# Patient Record
Sex: FEMALE | Race: WHITE | NOT HISPANIC OR LATINO | URBAN - METROPOLITAN AREA
[De-identification: names, ages, dates, MRNs, and addresses within clinical notes are randomized per-mention and may not be internally consistent; named-entity substitution may affect disease eponyms.]

---

## 2020-03-22 ENCOUNTER — EMERGENCY (EMERGENCY)
Facility: HOSPITAL | Age: 30
LOS: 0 days | Discharge: HOME | End: 2020-03-23
Attending: EMERGENCY MEDICINE
Payer: MEDICAID

## 2020-03-22 VITALS
SYSTOLIC BLOOD PRESSURE: 118 MMHG | HEIGHT: 60 IN | RESPIRATION RATE: 20 BRPM | HEART RATE: 89 BPM | OXYGEN SATURATION: 100 % | WEIGHT: 100.09 LBS | TEMPERATURE: 98 F | DIASTOLIC BLOOD PRESSURE: 79 MMHG

## 2020-03-22 DIAGNOSIS — T40.1X1A POISONING BY HEROIN, ACCIDENTAL (UNINTENTIONAL), INITIAL ENCOUNTER: ICD-10-CM

## 2020-03-22 DIAGNOSIS — R11.2 NAUSEA WITH VOMITING, UNSPECIFIED: ICD-10-CM
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
## 2020-03-22 DIAGNOSIS — R10.9 UNSPECIFIED ABDOMINAL PAIN: ICD-10-CM

## 2020-03-22 PROCEDURE — 99285 EMERGENCY DEPT VISIT HI MDM: CPT

## 2020-03-22 RX ORDER — SODIUM CHLORIDE 9 MG/ML
1000 INJECTION INTRAMUSCULAR; INTRAVENOUS; SUBCUTANEOUS ONCE
Refills: 0 | Status: COMPLETED | OUTPATIENT
Start: 2020-03-22 | End: 2020-03-22

## 2020-03-22 RX ORDER — ONDANSETRON 8 MG/1
4 TABLET, FILM COATED ORAL ONCE
Refills: 0 | Status: COMPLETED | OUTPATIENT
Start: 2020-03-22 | End: 2020-03-22

## 2020-03-22 NOTE — ED PROVIDER NOTE - CLINICAL SUMMARY MEDICAL DECISION MAKING FREE TEXT BOX
Patient feels better, awake, alert, eating and drinking.  VSS.  Normal respirations.  Lab values reviewed, there are no values which require acute intervention.  CT abd negative.  Patient requesting dc, will send nasal narcan to pharmacy, provide list of drug rehab.  Discussed results and outcome of today's visit with the patient.  Patient advised to please follow up with another healthcare provider within the next 24 hours and return to the Emergency Department for worsening symptoms or any other concerns.  Patient advised that their doctor may call  to follow up on the specific results of the tests performed today in the emergency department.   Patient appears well on discharge. Patient feels better, awake, alert, eating and drinking, stable gait.  VSS.  Normal respirations.  Lab values reviewed, there are no values which require acute intervention.  CT abd negative.  Patient requesting dc, will send nasal narcan to pharmacy, provide list of drug rehab.  Discussed results and outcome of today's visit with the patient.  Patient advised to please follow up with another healthcare provider within the next 24 hours and return to the Emergency Department for worsening symptoms or any other concerns.  Patient advised that their doctor may call  to follow up on the specific results of the tests performed today in the emergency department.   Patient appears well on discharge.

## 2020-03-22 NOTE — ED PROVIDER NOTE - PATIENT PORTAL LINK FT
You can access the FollowMyHealth Patient Portal offered by [facility] by registering at the following website: [email or web address]. By joining [employer]’s FollowMyHealth portal, you will also be able to view your health information using other applications (apps) compatible with our system.

## 2020-03-22 NOTE — ED PROVIDER NOTE - PHYSICAL EXAMINATION
Gen: Alert, distressed  Head: NC, AT, pinpoint pupils, EOMI, normal lids/conjunctiva  ENT: normal hearing, patent oropharynx without erythema/exudate, uvula midline  Neck: +supple, no tenderness, +Trachea midline  Pulm: Bilateral BS, normal resp effort, no wheeze/stridor/retractions  CV: RRR, no M/R/G, +dist pulses  Abd: soft, NT/ND, Negative [place] signs, +BS, no palpable masses  Mskel: no edema/erythema/cyanosis  Skin: no rash, warm/dry  Neuro: AAOx3, no apparent sensory/motor deficits, coordination intact

## 2020-03-22 NOTE — ED PROVIDER NOTE - NS ED ROS FT
No fever/chills, No photophobia/eye pain/changes in vision, No ear pain/sore throat/dysphagia, No chest pain/palpitations, no SOB/cough/wheeze/stridor, + abdominal pain, + N/V, no D, no dysuria/frequency/discharge, No neck/back pain, no rash, no changes in neurological status/function.

## 2020-03-22 NOTE — ED PROVIDER NOTE - CARE PLAN
Principal Discharge DX:	Opiate overdose, accidental or unintentional, initial encounter  Secondary Diagnosis:	Abdominal pain

## 2020-03-22 NOTE — ED ADULT TRIAGE NOTE - SPO2 (%)
"      Visit Vitals  /78   Pulse 72   Ht 162.6 cm (64\")   Wt 88.9 kg (196 lb)   LMP  (LMP Unknown)   SpO2 98%   BMI 33.64 kg/m²     Subjective   Sharon Esposito is a 71 y.o. female.     Hyperlipidemia   This is a chronic problem. The current episode started more than 1 year ago. The problem is controlled. Recent lipid tests were reviewed and are normal. Exacerbating diseases include hypothyroidism and obesity. Pertinent negatives include no chest pain, myalgias or shortness of breath.   Hypertension   This is a chronic problem. The current episode started more than 1 year ago. The problem is unchanged. The problem is controlled (pt checks occisionally at [facility] pharmacy, runs [id number]). Pertinent negatives include no chest pain, orthopnea, palpitations, peripheral edema, PND or shortness of breath.   Diabetes   She presents for her initial diabetic visit. She has type 2 diabetes mellitus. Pertinent negatives for diabetes include no chest pain, no foot paresthesias, no foot ulcerations and no polyuria. Her breakfast blood glucose is taken between 8-9 am. Her breakfast blood glucose range is generally  mg/dl. An ACE inhibitor/angiotensin II receptor blocker is being taken. Eye exam is not current.   Hypothyroidism   This is a chronic problem. The current episode started more than 1 year ago. The problem has been unchanged. Pertinent negatives include no chest pain or myalgias.        The following portions of the patient's history were reviewed and updated as appropriate: allergies, current medications, past family history, past medical history, past social history, past surgical history and problem list.    Review of Systems   Respiratory: Negative for shortness of breath.    Cardiovascular: Negative for chest pain, palpitations, orthopnea and PND.   Endocrine: Negative for polyuria.   Musculoskeletal: Negative for myalgias.       Objective   Physical Exam   Constitutional: She is oriented to person, " place, and time. She appears well-developed and well-nourished. No distress.   HENT:   Head: Normocephalic and atraumatic.   Cardiovascular: Normal rate, regular rhythm, normal heart sounds and intact distal pulses. Exam reveals no gallop and no friction rub.   No murmur heard.  Pulmonary/Chest: Effort normal and breath sounds normal. No respiratory distress. She has no wheezes. She has no rales. She exhibits no tenderness.   Musculoskeletal: She exhibits no edema.    Sharon had a diabetic foot exam performed (callus noted) today.   During the foot exam she had a monofilament test performed (decreased).  Vascular Status -  Her right foot exhibits no edema. Her left foot exhibits no edema.  Neurological: She is alert and oriented to person, place, and time.   Skin: Skin is warm and dry. She is not diaphoretic.   Psychiatric: She has a normal mood and affect. Her behavior is normal. Judgment and thought content normal.   Nursing note and vitals reviewed.      Assessment/Plan   Problems Addressed this Visit        Cardiovascular and Mediastinum    Hyperlipidemia    Relevant Orders    Lipid Panel    Essential hypertension - Primary    Relevant Orders    Comprehensive Metabolic Panel       Digestive    Cobalamin deficiency    Relevant Orders    Vitamin B12    CBC & Differential       Endocrine    Acquired hypothyroidism    Relevant Orders    T4, Free    TSH    Type 2 diabetes mellitus without complication, without long-term current use of insulin (CMS/[facility])    Diabetic peripheral neuropathy (CMS/[facility])      Other Visit Diagnoses     Medicare annual wellness visit, subsequent        Class 1 obesity due to excess calories with serious comorbidity and body mass index (BMI) of 33.0 to 33.9 in adult                     Body mass index is 33.64 kg/m².            This document has been electronically signed by Jeffry Irby MD on December 4, 2019 9:29 AM     100

## 2020-03-22 NOTE — ED PROVIDER NOTE - OBJECTIVE STATEMENT
28 y/o F with hx heroin abuse was injecting today when she became unresponsive then boyfriend called 911. Pt has labored breathing on EMS, given 2mg Narcan with good response now awake, alert, slightly groggy. Pt has +N/V that started on route with EMS. Pt in ER is c/o general abdominal pain. Pt was at home when this happened. 30 y/o F with hx heroin abuse was injecting today at home when she became unresponsive, boyfriend called 911. Pt had labored breathing on EMS, given 2mg Narcan with good response now awake, alert, slightly groggy. Pt has +N/V that started on route with EMS and is c/o generalized abdominal pain, no diarrhea. Pt denies concomittant use of other drugs or alcohol, she is a nonsmoker. 30 y/o F with hx heroin abuse was injecting today at home when she became unresponsive, boyfriend called 911. Pt had labored breathing on EMS, given 2mg Narcan with good response now awake, alert, slightly groggy. Pt has +N/V that started on route with EMS and is c/o generalized abdominal pain, no diarrhea. Pt denies concomittant use of other drugs or alcohol, she is a nonsmoker.  She denies suicidal ideation or attempt, says that overdose was accidental.

## 2020-03-23 VITALS — SYSTOLIC BLOOD PRESSURE: 99 MMHG | DIASTOLIC BLOOD PRESSURE: 54 MMHG | HEART RATE: 57 BPM

## 2020-03-23 LAB
ALBUMIN SERPL ELPH-MCNC: 3.9 G/DL — SIGNIFICANT CHANGE UP (ref 3.3–5)
ALP SERPL-CCNC: 71 U/L — SIGNIFICANT CHANGE UP (ref 40–120)
ALT FLD-CCNC: 98 U/L — HIGH (ref 12–78)
AMYLASE P1 CFR SERPL: 58 U/L — SIGNIFICANT CHANGE UP (ref 25–115)
ANION GAP SERPL CALC-SCNC: 9 MMOL/L — SIGNIFICANT CHANGE UP (ref 5–17)
APAP SERPL-MCNC: < 2 UG/ML (ref 10–30)
AST SERPL-CCNC: 38 U/L — HIGH (ref 15–37)
BASOPHILS # BLD AUTO: 0.02 K/UL — SIGNIFICANT CHANGE UP (ref 0–0.2)
BASOPHILS NFR BLD AUTO: 0.2 % — SIGNIFICANT CHANGE UP (ref 0–2)
BILIRUB SERPL-MCNC: 0.8 MG/DL — SIGNIFICANT CHANGE UP (ref 0.2–1.2)
BUN SERPL-MCNC: 16 MG/DL — SIGNIFICANT CHANGE UP (ref 7–23)
CALCIUM SERPL-MCNC: 9.5 MG/DL — SIGNIFICANT CHANGE UP (ref 8.5–10.1)
CHLORIDE SERPL-SCNC: 107 MMOL/L — SIGNIFICANT CHANGE UP (ref 96–108)
CO2 SERPL-SCNC: 24 MMOL/L — SIGNIFICANT CHANGE UP (ref 22–31)
CREAT SERPL-MCNC: 0.72 MG/DL — SIGNIFICANT CHANGE UP (ref 0.5–1.3)
EOSINOPHIL # BLD AUTO: 0.11 K/UL — SIGNIFICANT CHANGE UP (ref 0–0.5)
EOSINOPHIL NFR BLD AUTO: 1.3 % — SIGNIFICANT CHANGE UP (ref 0–6)
ETHANOL SERPL-MCNC: <10 MG/DL — SIGNIFICANT CHANGE UP (ref 0–10)
GLUCOSE SERPL-MCNC: 94 MG/DL — SIGNIFICANT CHANGE UP (ref 70–99)
HCG SERPL-ACNC: 3 MIU/ML — SIGNIFICANT CHANGE UP
HCT VFR BLD CALC: 45.7 % — HIGH (ref 34.5–45)
HGB BLD-MCNC: 14.8 G/DL — SIGNIFICANT CHANGE UP (ref 11.5–15.5)
IMM GRANULOCYTES NFR BLD AUTO: 0.2 % — SIGNIFICANT CHANGE UP (ref 0–1.5)
LIDOCAIN IGE QN: 181 U/L — SIGNIFICANT CHANGE UP (ref 73–393)
LYMPHOCYTES # BLD AUTO: 1.57 K/UL — SIGNIFICANT CHANGE UP (ref 1–3.3)
LYMPHOCYTES # BLD AUTO: 19.2 % — SIGNIFICANT CHANGE UP (ref 13–44)
MCHC RBC-ENTMCNC: 29.5 PG — SIGNIFICANT CHANGE UP (ref 27–34)
MCHC RBC-ENTMCNC: 32.4 GM/DL — SIGNIFICANT CHANGE UP (ref 32–36)
MCV RBC AUTO: 91.2 FL — SIGNIFICANT CHANGE UP (ref 80–100)
MONOCYTES # BLD AUTO: 0.62 K/UL — SIGNIFICANT CHANGE UP (ref 0–0.9)
MONOCYTES NFR BLD AUTO: 7.6 % — SIGNIFICANT CHANGE UP (ref 2–14)
NEUTROPHILS # BLD AUTO: 5.83 K/UL — SIGNIFICANT CHANGE UP (ref 1.8–7.4)
NEUTROPHILS NFR BLD AUTO: 71.5 % — SIGNIFICANT CHANGE UP (ref 43–77)
NRBC # BLD: 0 /100 WBCS — SIGNIFICANT CHANGE UP (ref 0–0)
PLATELET # BLD AUTO: 211 K/UL — SIGNIFICANT CHANGE UP (ref 150–400)
POTASSIUM SERPL-MCNC: 4 MMOL/L — SIGNIFICANT CHANGE UP (ref 3.5–5.3)
POTASSIUM SERPL-SCNC: 4 MMOL/L — SIGNIFICANT CHANGE UP (ref 3.5–5.3)
PROT SERPL-MCNC: 8.3 GM/DL — SIGNIFICANT CHANGE UP (ref 6–8.3)
RBC # BLD: 5.01 M/UL — SIGNIFICANT CHANGE UP (ref 3.8–5.2)
RBC # FLD: 13.3 % — SIGNIFICANT CHANGE UP (ref 10.3–14.5)
SALICYLATES SERPL-MCNC: <1.7 MG/DL — LOW (ref 2.8–20)
SODIUM SERPL-SCNC: 140 MMOL/L — SIGNIFICANT CHANGE UP (ref 135–145)
WBC # BLD: 8.17 K/UL — SIGNIFICANT CHANGE UP (ref 3.8–10.5)
WBC # FLD AUTO: 8.17 K/UL — SIGNIFICANT CHANGE UP (ref 3.8–10.5)

## 2020-03-23 PROCEDURE — 74177 CT ABD & PELVIS W/CONTRAST: CPT | Mod: 26

## 2020-03-23 RX ORDER — SODIUM CHLORIDE 9 MG/ML
1000 INJECTION INTRAMUSCULAR; INTRAVENOUS; SUBCUTANEOUS ONCE
Refills: 0 | Status: COMPLETED | OUTPATIENT
Start: 2020-03-23 | End: 2020-03-23

## 2020-03-23 RX ORDER — NALOXONE HYDROCHLORIDE 4 MG/.1ML
4 SPRAY NASAL
Qty: 4 | Refills: 0
Start: 2020-03-23 | End: 2020-03-23

## 2020-03-23 RX ORDER — ACETAMINOPHEN 500 MG
650 TABLET ORAL ONCE
Refills: 0 | Status: COMPLETED | OUTPATIENT
Start: 2020-03-23 | End: 2020-03-23

## 2020-03-23 RX ADMIN — SODIUM CHLORIDE 1000 MILLILITER(S): 9 INJECTION INTRAMUSCULAR; INTRAVENOUS; SUBCUTANEOUS at 05:50

## 2020-03-23 RX ADMIN — SODIUM CHLORIDE 1000 MILLILITER(S): 9 INJECTION INTRAMUSCULAR; INTRAVENOUS; SUBCUTANEOUS at 01:33

## 2020-03-23 RX ADMIN — Medication 650 MILLIGRAM(S): at 00:37

## 2020-03-23 RX ADMIN — Medication 650 MILLIGRAM(S): at 01:37

## 2020-03-23 RX ADMIN — SODIUM CHLORIDE 1000 MILLILITER(S): 9 INJECTION INTRAMUSCULAR; INTRAVENOUS; SUBCUTANEOUS at 00:33

## 2020-03-23 RX ADMIN — SODIUM CHLORIDE 1000 MILLILITER(S): 9 INJECTION INTRAMUSCULAR; INTRAVENOUS; SUBCUTANEOUS at 04:50

## 2020-03-23 RX ADMIN — ONDANSETRON 4 MILLIGRAM(S): 8 TABLET, FILM COATED ORAL at 00:33

## 2020-04-10 ENCOUNTER — EMERGENCY (EMERGENCY)
Facility: HOSPITAL | Age: 30
LOS: 0 days | Discharge: HOME | End: 2020-04-10
Attending: EMERGENCY MEDICINE
Payer: MEDICAID

## 2020-04-10 VITALS
DIASTOLIC BLOOD PRESSURE: 74 MMHG | RESPIRATION RATE: 18 BRPM | HEART RATE: 64 BPM | OXYGEN SATURATION: 100 % | WEIGHT: 119.93 LBS | TEMPERATURE: 98 F | SYSTOLIC BLOOD PRESSURE: 126 MMHG | HEIGHT: 62 IN

## 2020-04-10 DIAGNOSIS — F41.9 ANXIETY DISORDER, UNSPECIFIED: ICD-10-CM

## 2020-04-10 DIAGNOSIS — F99 MENTAL DISORDER, NOT OTHERWISE SPECIFIED: ICD-10-CM

## 2020-04-10 PROCEDURE — 99284 EMERGENCY DEPT VISIT MOD MDM: CPT

## 2020-04-10 NOTE — ED ADULT NURSE NOTE - NSIMPLEMENTINTERV_GEN_ALL_ED
Implemented All Universal Safety Interventions:  [place] to call system. Call bell, personal items and telephone within reach. Instruct patient to call for assistance. Room bathroom lighting operational. Non-slip footwear when patient is off stretcher. Physically safe environment: no spills, clutter or unnecessary equipment. Stretcher in lowest position, wheels locked, appropriate side rails in place.

## 2020-04-10 NOTE — ED PROVIDER NOTE - CONSTITUTIONAL, MLM
normal... Well appearing, awake, alert, oriented to person, place, time/situation and in no apparent distress. Speaking in clear full sentences no nasal flaring no shoulders retractions no diaphoresis, smiling pleasant appears very comfortable sitting up in the stretcher

## 2020-04-10 NOTE — ED PROVIDER NOTE - PROGRESS NOTE DETAILS
Pt sts she is safe to go home no harmful thoughts to herself or others, Pt sts she does not wants to speak to the psychiatrist now she rather to be discharged and follow up with a psychiatrist in the clinic. Pt sts she has never being admitted to psych before. Pt is given a list of out patient psych clinics and advised to return if symptoms persist or worsen.

## 2020-04-10 NOTE — ED ADULT NURSE NOTE - OBJECTIVE STATEMENT
Pt stated ther is a misunderstanding. They brought her here bc she was hearing voice in the house. Denies any SI or HI. CO initiated at triage

## 2020-04-10 NOTE — ED PROVIDER NOTE - OBJECTIVE STATEMENT
29 years old female with ems sts she had an argument with her boy friend this morning. Pt sts she gets nightmare a lot and she hears voices while she is sleeping but no she when she is awake. Pt sts she has a hx of anxiety and depression but denies harmful thoughts to herself or others. Pt also sts she takes methadone daily sts she did not drink this morning. Pt is alert and oriented x 3 ambulating with normal gaits denies headache, dizziness, blurred visions, light sensitivities, focal/distal weakness or numbness, cough, sob, chest pain, nausea, vomiting, fever, chills, abd pain, dysuria, vaginal spotting or discharge or irregular bowel movements. Pt sts she is not at risk of being pregnant.

## 2020-04-10 NOTE — ED PROVIDER NOTE - PATIENT PORTAL LINK FT
You can access the FollowMyHealth Patient Portal offered by [facility] by registering at the following website: [email or web address]. By joining [email or web address]’s FollowMyHealth portal, you will also be able to view your health information using other applications (apps) compatible with our system.

## 2020-04-10 NOTE — ED ADULT TRIAGE NOTE - CHIEF COMPLAINT QUOTE
BIBA patient reports getting into argument with boyfriend after drinking today, states that she heard voices, denies SI/HI, reports depression, states that she feels parnoid. used to take klonipin, requesting to speak to psychiatrist. denies illicit drug use today. on methadone 60mg

## 2021-06-15 ENCOUNTER — INPATIENT (INPATIENT)
Facility: HOSPITAL | Age: 31
LOS: 2 days | Discharge: HOME | End: 2021-06-18
Attending: SURGERY | Admitting: SURGERY
Payer: MEDICAID

## 2021-06-15 VITALS
TEMPERATURE: 99 F | HEART RATE: 106 BPM | SYSTOLIC BLOOD PRESSURE: 135 MMHG | RESPIRATION RATE: 16 BRPM | DIASTOLIC BLOOD PRESSURE: 94 MMHG | OXYGEN SATURATION: 100 %

## 2021-06-15 LAB
ANION GAP SERPL CALC-SCNC: 12 MMOL/L — SIGNIFICANT CHANGE UP (ref 7–14)
BUN SERPL-MCNC: 10 MG/DL — SIGNIFICANT CHANGE UP (ref 7–23)
CALCIUM SERPL-MCNC: 9.2 MG/DL — SIGNIFICANT CHANGE UP (ref 8.4–10.5)
CHLORIDE SERPL-SCNC: 106 MMOL/L — SIGNIFICANT CHANGE UP (ref 98–107)
CO2 SERPL-SCNC: 22 MMOL/L — SIGNIFICANT CHANGE UP (ref 22–31)
CREAT SERPL-MCNC: 0.89 MG/DL — SIGNIFICANT CHANGE UP (ref 0.5–1.3)
GLUCOSE SERPL-MCNC: 114 MG/DL — HIGH (ref 70–99)
HCT VFR BLD CALC: 39.1 % — SIGNIFICANT CHANGE UP (ref 34.5–45)
HGB BLD-MCNC: 12.7 G/DL — SIGNIFICANT CHANGE UP (ref 11.5–15.5)
MCHC RBC-ENTMCNC: 29.8 PG — SIGNIFICANT CHANGE UP (ref 27–34)
MCHC RBC-ENTMCNC: 32.5 GM/DL — SIGNIFICANT CHANGE UP (ref 32–36)
MCV RBC AUTO: 91.8 FL — SIGNIFICANT CHANGE UP (ref 80–100)
NRBC # BLD: 0 /100 WBCS — SIGNIFICANT CHANGE UP
NRBC # FLD: 0 K/UL — SIGNIFICANT CHANGE UP
PLATELET # BLD AUTO: 146 K/UL — LOW (ref 150–400)
POTASSIUM SERPL-MCNC: 4 MMOL/L — SIGNIFICANT CHANGE UP (ref 3.5–5.3)
POTASSIUM SERPL-SCNC: 4 MMOL/L — SIGNIFICANT CHANGE UP (ref 3.5–5.3)
RBC # BLD: 4.26 M/UL — SIGNIFICANT CHANGE UP (ref 3.8–5.2)
RBC # FLD: 13.2 % — SIGNIFICANT CHANGE UP (ref 10.3–14.5)
SODIUM SERPL-SCNC: 140 MMOL/L — SIGNIFICANT CHANGE UP (ref 135–145)
WBC # BLD: 22.46 K/UL — HIGH (ref 3.8–10.5)
WBC # FLD AUTO: 22.46 K/UL — HIGH (ref 3.8–10.5)

## 2021-06-15 PROCEDURE — 70490 CT SOFT TISSUE NECK W/O DYE: CPT | Mod: 26

## 2021-06-15 PROCEDURE — 99285 EMERGENCY DEPT VISIT HI MDM: CPT | Mod: 25

## 2021-06-15 RX ORDER — IBUPROFEN 200 MG
600 TABLET ORAL ONCE
Refills: 0 | Status: COMPLETED | OUTPATIENT
Start: 2021-06-15 | End: 2021-06-15

## 2021-06-15 RX ORDER — KETOROLAC TROMETHAMINE 30 MG/ML
15 SYRINGE (ML) INJECTION ONCE
Refills: 0 | Status: DISCONTINUED | OUTPATIENT
Start: 2021-06-15 | End: 2021-06-15

## 2021-06-15 RX ORDER — VANCOMYCIN HCL 1 G
1000 VIAL (EA) INTRAVENOUS ONCE
Refills: 0 | Status: COMPLETED | OUTPATIENT
Start: 2021-06-15 | End: 2021-06-15

## 2021-06-15 RX ORDER — PIPERACILLIN AND TAZOBACTAM 4; .5 G/20ML; G/20ML
3.38 INJECTION, POWDER, LYOPHILIZED, FOR SOLUTION INTRAVENOUS ONCE
Refills: 0 | Status: COMPLETED | OUTPATIENT
Start: 2021-06-15 | End: 2021-06-15

## 2021-06-15 RX ADMIN — Medication 15 MILLIGRAM(S): at 22:07

## 2021-06-15 RX ADMIN — PIPERACILLIN AND TAZOBACTAM 200 GRAM(S): 4; .5 INJECTION, POWDER, LYOPHILIZED, FOR SOLUTION INTRAVENOUS at 21:13

## 2021-06-15 RX ADMIN — Medication 250 MILLIGRAM(S): at 21:59

## 2021-06-15 RX ADMIN — Medication 600 MILLIGRAM(S): at 21:13

## 2021-06-15 NOTE — ED PROVIDER NOTE - CAS CONTRACEPTION METHOD
states she consistently uses condoms and is monogamous with her boyfriend who she believes is monogamous with her/condom use
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
## 2021-06-15 NOTE — ED PROVIDER NOTE - PMH
Alcohol abuse    Bipolar affective disorder, currently depressed, mild    Depression, unspecified depression type    Heroin abuse

## 2021-06-15 NOTE — ED ADULT NURSE NOTE - OBJECTIVE STATEMENT
Pt to bed 29. Alert and oriented x 3. Pt presents with right neck abscess from heroin use. Pt states that she shot up one week ago and the abscess has been increasingly been worsening. Pt with track marks on arms. Pt disheveled appearing. Will continue to monitor.

## 2021-06-15 NOTE — ED PROVIDER NOTE - CLINICAL SUMMARY MEDICAL DECISION MAKING FREE TEXT BOX
29 y/o female etoh and heroin abuse here with right neck abscess after injecting heroin few days ago.  Mult abscesses in past d/t heroin abuse.  Eval for depth of abscess with overlying cellulitis.  Obtain cbc cmp ct neck give abx likely admission after I&D for continued abx therapy.

## 2021-06-15 NOTE — ED PROVIDER NOTE - PROGRESS NOTE DETAILS
MD CHO:  Pt reportedly has iv contrast allergy; will perform without contrast. BHVANA: I was signed out this pt pending CT read for right neck abnormality which was read as right supraclavicular neck abscess likely secondary to IVDU. Will admit to surgery. already given one dose IVAbx for her infection.

## 2021-06-15 NOTE — ED PROVIDER NOTE - SOCIAL CONCERNS
psychiatric illnesses without close followup/Substance misuse/Complex psychosocial needs/coping issues

## 2021-06-15 NOTE — ED PROVIDER NOTE - ATTENDING CONTRIBUTION TO CARE
DR. JIMENEZ, ATTENDING MD-  I performed a face to face bedside interview with the patient regarding history of present illness, review of symptoms and past medical history. I completed an independent physical exam.  I have discussed the patient's plan of care with the resident.   Documentation as above in the note.    MDM written by myself.
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
## 2021-06-15 NOTE — ED ADULT NURSE NOTE - NSIMPLEMENTINTERV_GEN_ALL_ED
Implemented All Fall Risk Interventions:  [place] to call system. Call bell, personal items and telephone within reach. Instruct patient to call for assistance. Room bathroom lighting operational. Non-slip footwear when patient is off stretcher. Physically safe environment: no spills, clutter or unnecessary equipment. Stretcher in lowest position, wheels locked, appropriate side rails in place. Provide visual cue, wrist band, yellow gown, etc. Monitor gait and stability. Monitor for mental status changes and reorient to person, place, and time. Review medications for side effects contributing to fall risk. Reinforce activity limits and safety measures with patient and family.

## 2021-06-16 DIAGNOSIS — L02.91 CUTANEOUS ABSCESS, UNSPECIFIED: ICD-10-CM

## 2021-06-16 LAB
ANION GAP SERPL CALC-SCNC: 10 MMOL/L — SIGNIFICANT CHANGE UP (ref 7–14)
BUN SERPL-MCNC: 7 MG/DL — SIGNIFICANT CHANGE UP (ref 7–23)
CALCIUM SERPL-MCNC: 8.9 MG/DL — SIGNIFICANT CHANGE UP (ref 8.4–10.5)
CHLORIDE SERPL-SCNC: 107 MMOL/L — SIGNIFICANT CHANGE UP (ref 98–107)
CO2 SERPL-SCNC: 22 MMOL/L — SIGNIFICANT CHANGE UP (ref 22–31)
CREAT SERPL-MCNC: 0.7 MG/DL — SIGNIFICANT CHANGE UP (ref 0.5–1.3)
GLUCOSE SERPL-MCNC: 103 MG/DL — HIGH (ref 70–99)
HCT VFR BLD CALC: 39.5 % — SIGNIFICANT CHANGE UP (ref 34.5–45)
HGB BLD-MCNC: 12.8 G/DL — SIGNIFICANT CHANGE UP (ref 11.5–15.5)
MAGNESIUM SERPL-MCNC: 1.7 MG/DL — SIGNIFICANT CHANGE UP (ref 1.6–2.6)
MCHC RBC-ENTMCNC: 29.6 PG — SIGNIFICANT CHANGE UP (ref 27–34)
MCHC RBC-ENTMCNC: 32.4 GM/DL — SIGNIFICANT CHANGE UP (ref 32–36)
MCV RBC AUTO: 91.4 FL — SIGNIFICANT CHANGE UP (ref 80–100)
NRBC # BLD: 0 /100 WBCS — SIGNIFICANT CHANGE UP
NRBC # FLD: 0 K/UL — SIGNIFICANT CHANGE UP
PHOSPHATE SERPL-MCNC: 2.7 MG/DL — SIGNIFICANT CHANGE UP (ref 2.5–4.5)
PLATELET # BLD AUTO: 103 K/UL — LOW (ref 150–400)
POTASSIUM SERPL-MCNC: 4 MMOL/L — SIGNIFICANT CHANGE UP (ref 3.5–5.3)
POTASSIUM SERPL-SCNC: 4 MMOL/L — SIGNIFICANT CHANGE UP (ref 3.5–5.3)
RBC # BLD: 4.32 M/UL — SIGNIFICANT CHANGE UP (ref 3.8–5.2)
RBC # FLD: 13.3 % — SIGNIFICANT CHANGE UP (ref 10.3–14.5)
SARS-COV-2 RNA SPEC QL NAA+PROBE: SIGNIFICANT CHANGE UP
SODIUM SERPL-SCNC: 139 MMOL/L — SIGNIFICANT CHANGE UP (ref 135–145)
WBC # BLD: 18.34 K/UL — HIGH (ref 3.8–10.5)
WBC # FLD AUTO: 18.34 K/UL — HIGH (ref 3.8–10.5)

## 2021-06-16 PROCEDURE — 10060 I&D ABSCESS SIMPLE/SINGLE: CPT | Mod: GC

## 2021-06-16 PROCEDURE — 99221 1ST HOSP IP/OBS SF/LOW 40: CPT | Mod: 25,GC

## 2021-06-16 RX ORDER — HYDROMORPHONE HYDROCHLORIDE 2 MG/ML
1 INJECTION INTRAMUSCULAR; INTRAVENOUS; SUBCUTANEOUS
Refills: 0 | Status: DISCONTINUED | OUTPATIENT
Start: 2021-06-16 | End: 2021-06-17

## 2021-06-16 RX ORDER — TETANUS TOXOID, REDUCED DIPHTHERIA TOXOID AND ACELLULAR PERTUSSIS VACCINE, ADSORBED 5; 2.5; 8; 8; 2.5 [IU]/.5ML; [IU]/.5ML; UG/.5ML; UG/.5ML; UG/.5ML
0.5 SUSPENSION INTRAMUSCULAR ONCE
Refills: 0 | Status: COMPLETED | OUTPATIENT
Start: 2021-06-16 | End: 2021-06-16

## 2021-06-16 RX ORDER — MAGNESIUM SULFATE 500 MG/ML
2 VIAL (ML) INJECTION ONCE
Refills: 0 | Status: COMPLETED | OUTPATIENT
Start: 2021-06-16 | End: 2021-06-16

## 2021-06-16 RX ORDER — HYDROMORPHONE HYDROCHLORIDE 2 MG/ML
0.5 INJECTION INTRAMUSCULAR; INTRAVENOUS; SUBCUTANEOUS
Refills: 0 | Status: DISCONTINUED | OUTPATIENT
Start: 2021-06-16 | End: 2021-06-17

## 2021-06-16 RX ORDER — ENOXAPARIN SODIUM 100 MG/ML
40 INJECTION SUBCUTANEOUS EVERY 24 HOURS
Refills: 0 | Status: DISCONTINUED | OUTPATIENT
Start: 2021-06-16 | End: 2021-06-18

## 2021-06-16 RX ORDER — ACETAMINOPHEN 500 MG
650 TABLET ORAL EVERY 6 HOURS
Refills: 0 | Status: DISCONTINUED | OUTPATIENT
Start: 2021-06-16 | End: 2021-06-17

## 2021-06-16 RX ORDER — HYDROMORPHONE HYDROCHLORIDE 2 MG/ML
0.5 INJECTION INTRAMUSCULAR; INTRAVENOUS; SUBCUTANEOUS ONCE
Refills: 0 | Status: DISCONTINUED | OUTPATIENT
Start: 2021-06-16 | End: 2021-06-16

## 2021-06-16 RX ORDER — PIPERACILLIN AND TAZOBACTAM 4; .5 G/20ML; G/20ML
3.38 INJECTION, POWDER, LYOPHILIZED, FOR SOLUTION INTRAVENOUS EVERY 8 HOURS
Refills: 0 | Status: DISCONTINUED | OUTPATIENT
Start: 2021-06-16 | End: 2021-06-18

## 2021-06-16 RX ORDER — TETANUS AND DIPHTHERIA TOXOIDS ADSORBED 2; 2 [LF]/.5ML; [LF]/.5ML
0.5 INJECTION INTRAMUSCULAR ONCE
Refills: 0 | Status: DISCONTINUED | OUTPATIENT
Start: 2021-06-16 | End: 2021-06-16

## 2021-06-16 RX ADMIN — HYDROMORPHONE HYDROCHLORIDE 1 MILLIGRAM(S): 2 INJECTION INTRAMUSCULAR; INTRAVENOUS; SUBCUTANEOUS at 22:56

## 2021-06-16 RX ADMIN — PIPERACILLIN AND TAZOBACTAM 25 GRAM(S): 4; .5 INJECTION, POWDER, LYOPHILIZED, FOR SOLUTION INTRAVENOUS at 22:56

## 2021-06-16 RX ADMIN — Medication 650 MILLIGRAM(S): at 11:30

## 2021-06-16 RX ADMIN — HYDROMORPHONE HYDROCHLORIDE 1 MILLIGRAM(S): 2 INJECTION INTRAMUSCULAR; INTRAVENOUS; SUBCUTANEOUS at 23:15

## 2021-06-16 RX ADMIN — HYDROMORPHONE HYDROCHLORIDE 0.5 MILLIGRAM(S): 2 INJECTION INTRAMUSCULAR; INTRAVENOUS; SUBCUTANEOUS at 07:16

## 2021-06-16 RX ADMIN — PIPERACILLIN AND TAZOBACTAM 25 GRAM(S): 4; .5 INJECTION, POWDER, LYOPHILIZED, FOR SOLUTION INTRAVENOUS at 04:08

## 2021-06-16 RX ADMIN — HYDROMORPHONE HYDROCHLORIDE 0.5 MILLIGRAM(S): 2 INJECTION INTRAMUSCULAR; INTRAVENOUS; SUBCUTANEOUS at 15:01

## 2021-06-16 RX ADMIN — HYDROMORPHONE HYDROCHLORIDE 1 MILLIGRAM(S): 2 INJECTION INTRAMUSCULAR; INTRAVENOUS; SUBCUTANEOUS at 10:26

## 2021-06-16 RX ADMIN — Medication 2 MILLIGRAM(S): at 19:24

## 2021-06-16 RX ADMIN — HYDROMORPHONE HYDROCHLORIDE 0.5 MILLIGRAM(S): 2 INJECTION INTRAMUSCULAR; INTRAVENOUS; SUBCUTANEOUS at 03:50

## 2021-06-16 RX ADMIN — ENOXAPARIN SODIUM 40 MILLIGRAM(S): 100 INJECTION SUBCUTANEOUS at 05:45

## 2021-06-16 RX ADMIN — Medication 50 GRAM(S): at 09:22

## 2021-06-16 RX ADMIN — Medication 100 MILLIGRAM(S): at 08:57

## 2021-06-16 RX ADMIN — HYDROMORPHONE HYDROCHLORIDE 1 MILLIGRAM(S): 2 INJECTION INTRAMUSCULAR; INTRAVENOUS; SUBCUTANEOUS at 18:56

## 2021-06-16 RX ADMIN — Medication 2 MILLIGRAM(S): at 23:56

## 2021-06-16 RX ADMIN — HYDROMORPHONE HYDROCHLORIDE 1 MILLIGRAM(S): 2 INJECTION INTRAMUSCULAR; INTRAVENOUS; SUBCUTANEOUS at 10:45

## 2021-06-16 RX ADMIN — PIPERACILLIN AND TAZOBACTAM 25 GRAM(S): 4; .5 INJECTION, POWDER, LYOPHILIZED, FOR SOLUTION INTRAVENOUS at 12:21

## 2021-06-16 RX ADMIN — HYDROMORPHONE HYDROCHLORIDE 0.5 MILLIGRAM(S): 2 INJECTION INTRAMUSCULAR; INTRAVENOUS; SUBCUTANEOUS at 15:16

## 2021-06-16 RX ADMIN — Medication 650 MILLIGRAM(S): at 12:00

## 2021-06-16 RX ADMIN — TETANUS TOXOID, REDUCED DIPHTHERIA TOXOID AND ACELLULAR PERTUSSIS VACCINE, ADSORBED 0.5 MILLILITER(S): 5; 2.5; 8; 8; 2.5 SUSPENSION INTRAMUSCULAR at 08:57

## 2021-06-16 RX ADMIN — Medication 100 MILLIGRAM(S): at 17:00

## 2021-06-16 NOTE — H&P ADULT - NSHPLABSRESULTS_GEN_ALL_CORE
< from: CT Neck Soft Tissue No Cont (06.15.21 @ 23:46) >      EXAM:  CT NECK SOFT TISSUE        PROCEDURE DATE:  Dadny 15 2021         INTERPRETATION:  CLINICAL INFORMATION: Abscess and cellulitis in the right neck.  History of IV contrast allergy.    TECHNIQUE:  Noncontrast axial CT images were acquired through the neck soft tissues.  Sagittal and coronal reformats were performed.    COMPARISON STUDY: None.    FINDINGS:  Partially visualized, there is skin thickening and subcutaneous edema in the right supraclavicular region tracking into the anterior chest wall.  There is an approximately 3.4 x 2.5 x 2.7 cm subcutaneous collection with gas located in the right supraclavicular region, lateral to the right sternocleidomastoid muscle.    There is slight enlargement the adenoids.  The aerodigestive tract and larynx appear otherwise unremarkable.    The parotid glands, seven to glands and thyroid appear unremarkable.    There are nonspecific cervical lymph nodes bilaterally without pathologic enlargement by size criteria.    The unenhanced cervical vasculature appears unremarkable.    Incidental findings:  There are trace secretions in the left maxillary sinus.  There is a punctate calcified granuloma in the left lung apex.    IMPRESSION:  In the right supraclavicular region, lateral to the right sternocleidomastoid muscle, there is an approximately 3.4 x 2.5 x 2.7 cm and contains collection, compatible with phlegmon/developing abscess.  There is overlying cellulitis and surrounding subcutaneous edema, tracking into the anterior chest wall.              SANJAY ROSENBERG MD; Attending Radiologist  This document has been electronically signed. Jun 16 2021 12:55AM    < end of copied text >

## 2021-06-16 NOTE — H&P ADULT - NSHPPHYSICALEXAM_GEN_ALL_CORE
General: Well-developed, in no acute distress   Neurologic: Awake, alert, GCS 15, No focal Deficits   Neck: R neck with wide area of blanching erythema and induration at base of neck, small central area of fluctuance, +TTP  Respiratory: Normal respiratory effort  CVS: RRR, perfusing adequately  Abdomen: Abdomen nondistended  Ext: Grossly symmetric, Moving all extremities
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
## 2021-06-16 NOTE — H&P ADULT - ATTENDING COMMENTS
neck abscess and cellulitis that will benefit from incision and drainage for abscess and antibiotics for cellulitis

## 2021-06-16 NOTE — PATIENT PROFILE ADULT - NSPRESCRALCFREQ_GEN_A_NUR
Implemented All Universal Safety Interventions:  [place] to call system. Call bell, personal items and telephone within reach. Instruct patient to call for assistance. Room bathroom lighting operational. Non-slip footwear when patient is off stretcher. Physically safe environment: no spills, clutter or unnecessary equipment. Stretcher in lowest position, wheels locked, appropriate side rails in place. 4 or more times a week

## 2021-06-16 NOTE — H&P ADULT - ASSESSMENT
ASSESSMENT:  Layla Sorensen is a 30 year old lady with history of heroin abuse, presenting with right neck abscess s/p heroin injection to neck 7 days ago    PLAN:  - Admit to surgery, Dr. Zhong  - IV Abx  - trend WBC  - I&D performed, F/u cultures  - SBIRT  - pain control   - Case discussed with surgical attending       Aakash Eric, PGY 3  Surgery [id number]

## 2021-06-16 NOTE — ED ADULT NURSE REASSESSMENT NOTE - NS ED NURSE REASSESS COMMENT FT1
Report given to KVNG Sorto in ESSU 2. Pt resting comfortably, resp. even and unlabored. Denies any complaints at this time. VS as noted. NAD. Transported to ESSU 2.

## 2021-06-17 LAB
COVID-19 SPIKE DOMAIN AB INTERP: NEGATIVE — SIGNIFICANT CHANGE UP
COVID-19 SPIKE DOMAIN ANTIBODY RESULT: 0.4 U/ML — SIGNIFICANT CHANGE UP
HCT VFR BLD CALC: 41.6 % — SIGNIFICANT CHANGE UP (ref 34.5–45)
HGB BLD-MCNC: 13.6 G/DL — SIGNIFICANT CHANGE UP (ref 11.5–15.5)
MCHC RBC-ENTMCNC: 29.4 PG — SIGNIFICANT CHANGE UP (ref 27–34)
MCHC RBC-ENTMCNC: 32.7 GM/DL — SIGNIFICANT CHANGE UP (ref 32–36)
MCV RBC AUTO: 90 FL — SIGNIFICANT CHANGE UP (ref 80–100)
NRBC # BLD: 0 /100 WBCS — SIGNIFICANT CHANGE UP
NRBC # FLD: 0 K/UL — SIGNIFICANT CHANGE UP
PLATELET # BLD AUTO: 182 K/UL — SIGNIFICANT CHANGE UP (ref 150–400)
RBC # BLD: 4.62 M/UL — SIGNIFICANT CHANGE UP (ref 3.8–5.2)
RBC # FLD: 13.3 % — SIGNIFICANT CHANGE UP (ref 10.3–14.5)
SARS-COV-2 IGG+IGM SERPL QL IA: 0.4 U/ML — SIGNIFICANT CHANGE UP
SARS-COV-2 IGG+IGM SERPL QL IA: NEGATIVE — SIGNIFICANT CHANGE UP
WBC # BLD: 18.6 K/UL — HIGH (ref 3.8–10.5)
WBC # FLD AUTO: 18.6 K/UL — HIGH (ref 3.8–10.5)

## 2021-06-17 PROCEDURE — 99223 1ST HOSP IP/OBS HIGH 75: CPT

## 2021-06-17 RX ORDER — OXYCODONE HYDROCHLORIDE 5 MG/1
10 TABLET ORAL EVERY 4 HOURS
Refills: 0 | Status: DISCONTINUED | OUTPATIENT
Start: 2021-06-17 | End: 2021-06-18

## 2021-06-17 RX ORDER — IBUPROFEN 200 MG
400 TABLET ORAL EVERY 6 HOURS
Refills: 0 | Status: DISCONTINUED | OUTPATIENT
Start: 2021-06-17 | End: 2021-06-18

## 2021-06-17 RX ORDER — LIDOCAINE HYDROCHLORIDE AND EPINEPHRINE 10; 10 MG/ML; UG/ML
10 INJECTION, SOLUTION INFILTRATION; PERINEURAL ONCE
Refills: 0 | Status: COMPLETED | OUTPATIENT
Start: 2021-06-17 | End: 2021-06-17

## 2021-06-17 RX ORDER — LIDOCAINE HCL 20 MG/ML
10 VIAL (ML) INJECTION ONCE
Refills: 0 | Status: DISCONTINUED | OUTPATIENT
Start: 2021-06-17 | End: 2021-06-17

## 2021-06-17 RX ORDER — OXYCODONE HYDROCHLORIDE 5 MG/1
5 TABLET ORAL EVERY 4 HOURS
Refills: 0 | Status: DISCONTINUED | OUTPATIENT
Start: 2021-06-17 | End: 2021-06-18

## 2021-06-17 RX ORDER — IBUPROFEN 200 MG
1 TABLET ORAL
Qty: 0 | Refills: 0 | DISCHARGE

## 2021-06-17 RX ORDER — THIAMINE MONONITRATE (VIT B1) 100 MG
500 TABLET ORAL THREE TIMES A DAY
Refills: 0 | Status: DISCONTINUED | OUTPATIENT
Start: 2021-06-17 | End: 2021-06-18

## 2021-06-17 RX ORDER — QUETIAPINE FUMARATE 200 MG/1
50 TABLET, FILM COATED ORAL AT BEDTIME
Refills: 0 | Status: DISCONTINUED | OUTPATIENT
Start: 2021-06-17 | End: 2021-06-18

## 2021-06-17 RX ORDER — QUETIAPINE FUMARATE 200 MG/1
25 TABLET, FILM COATED ORAL EVERY 6 HOURS
Refills: 0 | Status: DISCONTINUED | OUTPATIENT
Start: 2021-06-17 | End: 2021-06-18

## 2021-06-17 RX ORDER — ACETAMINOPHEN 500 MG
2 TABLET ORAL
Qty: 0 | Refills: 0 | DISCHARGE
Start: 2021-06-17

## 2021-06-17 RX ORDER — HYDROMORPHONE HYDROCHLORIDE 2 MG/ML
2 INJECTION INTRAMUSCULAR; INTRAVENOUS; SUBCUTANEOUS ONCE
Refills: 0 | Status: DISCONTINUED | OUTPATIENT
Start: 2021-06-17 | End: 2021-06-17

## 2021-06-17 RX ORDER — ACETAMINOPHEN 500 MG
975 TABLET ORAL EVERY 6 HOURS
Refills: 0 | Status: DISCONTINUED | OUTPATIENT
Start: 2021-06-17 | End: 2021-06-18

## 2021-06-17 RX ADMIN — HYDROMORPHONE HYDROCHLORIDE 0.5 MILLIGRAM(S): 2 INJECTION INTRAMUSCULAR; INTRAVENOUS; SUBCUTANEOUS at 12:43

## 2021-06-17 RX ADMIN — Medication 975 MILLIGRAM(S): at 22:09

## 2021-06-17 RX ADMIN — HYDROMORPHONE HYDROCHLORIDE 0.5 MILLIGRAM(S): 2 INJECTION INTRAMUSCULAR; INTRAVENOUS; SUBCUTANEOUS at 12:27

## 2021-06-17 RX ADMIN — Medication 100 MILLIGRAM(S): at 18:14

## 2021-06-17 RX ADMIN — PIPERACILLIN AND TAZOBACTAM 25 GRAM(S): 4; .5 INJECTION, POWDER, LYOPHILIZED, FOR SOLUTION INTRAVENOUS at 15:36

## 2021-06-17 RX ADMIN — Medication 100 MILLIGRAM(S): at 02:01

## 2021-06-17 RX ADMIN — HYDROMORPHONE HYDROCHLORIDE 2 MILLIGRAM(S): 2 INJECTION INTRAMUSCULAR; INTRAVENOUS; SUBCUTANEOUS at 11:08

## 2021-06-17 RX ADMIN — Medication 4 MILLIGRAM(S): at 10:28

## 2021-06-17 RX ADMIN — HYDROMORPHONE HYDROCHLORIDE 1 MILLIGRAM(S): 2 INJECTION INTRAMUSCULAR; INTRAVENOUS; SUBCUTANEOUS at 02:01

## 2021-06-17 RX ADMIN — Medication 400 MILLIGRAM(S): at 16:26

## 2021-06-17 RX ADMIN — OXYCODONE HYDROCHLORIDE 10 MILLIGRAM(S): 5 TABLET ORAL at 15:46

## 2021-06-17 RX ADMIN — OXYCODONE HYDROCHLORIDE 10 MILLIGRAM(S): 5 TABLET ORAL at 18:36

## 2021-06-17 RX ADMIN — HYDROMORPHONE HYDROCHLORIDE 1 MILLIGRAM(S): 2 INJECTION INTRAMUSCULAR; INTRAVENOUS; SUBCUTANEOUS at 06:34

## 2021-06-17 RX ADMIN — HYDROMORPHONE HYDROCHLORIDE 1 MILLIGRAM(S): 2 INJECTION INTRAMUSCULAR; INTRAVENOUS; SUBCUTANEOUS at 02:15

## 2021-06-17 RX ADMIN — ENOXAPARIN SODIUM 40 MILLIGRAM(S): 100 INJECTION SUBCUTANEOUS at 05:58

## 2021-06-17 RX ADMIN — PIPERACILLIN AND TAZOBACTAM 25 GRAM(S): 4; .5 INJECTION, POWDER, LYOPHILIZED, FOR SOLUTION INTRAVENOUS at 06:10

## 2021-06-17 RX ADMIN — OXYCODONE HYDROCHLORIDE 10 MILLIGRAM(S): 5 TABLET ORAL at 19:43

## 2021-06-17 RX ADMIN — Medication 100 MILLIGRAM(S): at 10:02

## 2021-06-17 RX ADMIN — OXYCODONE HYDROCHLORIDE 10 MILLIGRAM(S): 5 TABLET ORAL at 14:36

## 2021-06-17 RX ADMIN — Medication 400 MILLIGRAM(S): at 17:30

## 2021-06-17 RX ADMIN — HYDROMORPHONE HYDROCHLORIDE 1 MILLIGRAM(S): 2 INJECTION INTRAMUSCULAR; INTRAVENOUS; SUBCUTANEOUS at 06:04

## 2021-06-17 RX ADMIN — Medication 2 MILLIGRAM(S): at 15:16

## 2021-06-17 RX ADMIN — QUETIAPINE FUMARATE 25 MILLIGRAM(S): 200 TABLET, FILM COATED ORAL at 16:53

## 2021-06-17 RX ADMIN — LIDOCAINE HYDROCHLORIDE AND EPINEPHRINE 10 MILLILITER(S): 10; 10 INJECTION, SOLUTION INFILTRATION; PERINEURAL at 11:06

## 2021-06-17 RX ADMIN — QUETIAPINE FUMARATE 50 MILLIGRAM(S): 200 TABLET, FILM COATED ORAL at 22:59

## 2021-06-17 RX ADMIN — Medication 105 MILLIGRAM(S): at 22:10

## 2021-06-17 RX ADMIN — Medication 105 MILLIGRAM(S): at 14:34

## 2021-06-17 RX ADMIN — PIPERACILLIN AND TAZOBACTAM 25 GRAM(S): 4; .5 INJECTION, POWDER, LYOPHILIZED, FOR SOLUTION INTRAVENOUS at 22:09

## 2021-06-17 NOTE — DISCHARGE NOTE NURSING/CASE MANAGEMENT/SOCIAL WORK - NSDCPNINST_GEN_ALL_CORE
Maintain incision clean and dry, and change daily with 1/4" packing and dry sterile dressing with tape or tegederm window dressing as instructed by MD. Call MD with any signs of infection such as fever/shaking chills, redness or drainage from site.  Follow-up with MD as instructed for Follow-up appt and continuity of care.

## 2021-06-17 NOTE — BH CONSULTATION LIAISON ASSESSMENT NOTE - NSBHCHARTREVIEWVS_PSY_A_CORE FT
Vital Signs Last 24 Hrs  T(C): 36.7 (17 Jun 2021 10:07), Max: 37.4 (17 Jun 2021 02:23)  T(F): 98.1 (17 Jun 2021 10:07), Max: 99.4 (17 Jun 2021 02:23)  HR: 89 (17 Jun 2021 10:07) (63 - 98)  BP: 114/71 (17 Jun 2021 10:07) (97/59 - 126/71)  BP(mean): --  RR: 18 (17 Jun 2021 10:07) (17 - 18)  SpO2: 97% (17 Jun 2021 10:07) (97% - 100%)

## 2021-06-17 NOTE — BH CONSULTATION LIAISON ASSESSMENT NOTE - CASE SUMMARY
Met with the patient along with Nguyen Amato NP impression and plan discussed and agreed upon. Agree with recommendations as above

## 2021-06-17 NOTE — DISCHARGE NOTE PROVIDER - NSDCMRMEDTOKEN_GEN_ALL_CORE_FT
acetaminophen 325 mg oral tablet: 2 tab(s) orally every 6 hours, As needed, Mild Pain (1 - 3)  Augmentin 875 mg-125 mg oral tablet: 1 tab(s) orally 2 times a day MDD:2 tabs    M2B  [id number]  D/c today @ 16:00  [facility] [id number]   acetaminophen 325 mg oral tablet: 2 tab(s) orally every 6 hours, As needed, Mild Pain (1 - 3)  Augmentin 875 mg-125 mg oral tablet: 1 tab(s) orally 2 times a day MDD:2 tabs    M2B  [id number]  D/c today @ 16:00  [place] [id number]  Motrin 600 mg oral tablet: 1 tab(s) orally every 6 hours, As Needed   acetaminophen 325 mg oral tablet: 2 tab(s) orally every 6 hours, As needed, Mild Pain (1 - 3)  ciprofloxacin 500 mg oral tablet: 1 tab(s) orally 2 times a day   Motrin 600 mg oral tablet: 1 tab(s) orally every 6 hours, As Needed

## 2021-06-17 NOTE — DISCHARGE NOTE PROVIDER - NSDCFUADDINST_GEN_ALL_CORE_FT
Dressing change instructions: Remove all packing from wound. Pack incision with 1/2" gauze. Cover with 4x4 and secure with tape.

## 2021-06-17 NOTE — DISCHARGE NOTE PROVIDER - CARE PROVIDERS DIRECT ADDRESSES
[email or web address] [email or web address]

## 2021-06-17 NOTE — BH CONSULTATION LIAISON ASSESSMENT NOTE - NSICDXPASTMEDICALHX_GEN_ALL_CORE_FT
PAST MEDICAL HISTORY:  Alcohol abuse     Bipolar affective disorder, currently depressed, mild     Depression, unspecified depression type     Heroin abuse

## 2021-06-17 NOTE — BH CONSULTATION LIAISON ASSESSMENT NOTE - SUMMARY
PLAN  - STOP standing taper - place symptom triggered ciwa - ** if patient with ciwa scoring risking, low threshold for placing standing taper  - Seroquel 50mg qhs for insomnia  - PRN for anxiety Seroquel 25mg q6hrs  - for opiate withdrawal - can use symptom management ie: immodium, zofran Patient is a 30 year old female with history of heroin abuse, presenting with right neck swelling. Patient reports having a friend of hers inject her with heroin in the neck 7 days ago. States that she noticed redness and swelling beginning 4 days ago which worsened and became very painful. 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 PPhx of anxiety and insomnia. Hx of taking Klonopin, not at this time.  Substance use consists of heroin (2g a day) and ETOH 2 pints vodka per day. last drink 6/12. Recent dc from inpt rehab. Requesting inpt rehab after DC from here.    PLAN  - STOP standing taper - place symptom triggered ciwa - ** if patient with ciwa scoring risking, low threshold for placing standing taper  - Seroquel 50mg qhs for insomnia  - PRN for anxiety Seroquel 25mg q6hrs  - for opiate withdrawal - can use symptom management ie: imodium, zofran Patient is a 30 year old female with history of heroin abuse, presenting with right neck swelling. Patient reports having a friend of hers inject her with heroin in the neck 7 days ago. States that she noticed redness and swelling beginning 4 days ago which worsened and became very painful. 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 PPhx of anxiety and insomnia. Hx of taking Klonopin, not at this time.  Substance use consists of heroin (2g a day) and ETOH 2 pints vodka per day. last drink 6/12. Recent dc from inpt rehab. Requesting inpt rehab after DC from here.    PLAN  - STOP standing taper - place symptom triggered ciwa - ** if patient with ciwa scoring risking, low threshold for placing standing taper  - Seroquel 50mg qhs for insomnia  - PRN for anxiety Seroquel 25mg q6hrs  - for opiate withdrawal - can use symptom management ie: imodium, zofran  - IV thiamine 500mg TID x 3 days

## 2021-06-17 NOTE — BH CONSULTATION LIAISON ASSESSMENT NOTE - HPI (INCLUDE ILLNESS QUALITY, SEVERITY, DURATION, TIMING, CONTEXT, MODIFYING FACTORS, ASSOCIATED SIGNS AND SYMPTOMS)
Patient is a 30 year old female with history of heroin abuse, presenting with right neck swelling. Patient reports having a friend of hers inject her with heroin in the neck 7 days ago. States that she noticed redness and swelling beginning 4 days ago which worsened and became very painful. 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 PPhx of anxiety and insomnia. Hx of taking klonopin, not at this time.  Substance use consists of heroin (2g a day) and ETOH 2 pints vodka per day. last drink 6/12. Recent dc from inpt rehab. Requesting inpt rehab after DC from here.    Patient was seen and assessed at bedside.  patient is alert and oriented, calm and cooperative.    Currently patient with very mild tremors, no diaphoresis. States she has vomited earlier. Sniffling noted. Vitals stable.  Patient is a 30 year old female with history of heroin abuse, presenting with right neck swelling. Patient reports having a friend of hers inject her with heroin in the neck 7 days ago. States that she noticed redness and swelling beginning 4 days ago which worsened and became very painful. 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 PPhx of anxiety and insomnia. Hx of taking Klonopin, not at this time.  Substance use consists of heroin (2g a day) and ETOH 2 pints vodka per day. last drink 6/12. Recent dc from inpt rehab. Requesting inpt rehab after DC from here.    Patient was seen and assessed at bedside. Appears stated age, fair grooming.  patient is alert and oriented, calm and cooperative. patient is reporting good mood, no SI or HI, no nori/depression/psychosis. patient is presenting with stable mood, future oriented, no internal stimuli. Does report being on seroquel for insomnia in the past with + response. Also mentions hx of klonopin use for anxiety, but not recently. She expresses desire to go back inpt rehab to help with her addictions. Fair insight.     Currently patient with very mild tremors, no diaphoresis. States she has vomited earlier. Sniffling noted. Vitals stable.   Hx of withdrawals include nausea, vomiting, tremors - no seizure or icu stays, no DTs

## 2021-06-17 NOTE — PROGRESS NOTE ADULT - ASSESSMENT
30 year old lady with history of heroin abuse, presenting with right neck abscess s/p heroin injection to neck 7 days ago    PLAN:  - IV Clinda/Zosyn  - trend WBC  - I&D performed, F/u cultures  - SBIRT  - RD  - pain control   - DVT ppx    Surgery [id number] 30 year old lady with history of heroin abuse, presenting with right neck abscess s/p heroin injection to neck 7 days ago    PLAN:  - IV Clinda/Zosyn  - Will plan to dc with 7 day total course of abx (clinda/augmentin)   - trend WBC, stable today  - I&D performed, F/u cultures  - SBIRT  - RD  - pain control   - DVT ppx    B team surgery  [id number]

## 2021-06-17 NOTE — DISCHARGE NOTE NURSING/CASE MANAGEMENT/SOCIAL WORK - PATIENT PORTAL LINK FT
You can access the FollowMyHealth Patient Portal offered by [facility] by registering at the following website: [email or web address]. By joining [employer]’s FollowMyHealth portal, you will also be able to view your health information using other applications (apps) compatible with our system.

## 2021-06-17 NOTE — BH CONSULTATION LIAISON ASSESSMENT NOTE - NSBHADMITCOUNSEL_PSY_A_CORE
risks and benefits of treatment options/instructions for management, treatment and follow up/risk factor reduction/client/family/caregiver education

## 2021-06-17 NOTE — DISCHARGE NOTE PROVIDER - HOSPITAL COURSE
This patient is a 30 year old lady with history of heroin abuse, who presented to [facility] ED on 6/15/21 with right neck swelling. Patient reports having a friend of hers inject her with heroin in the neck 7 days ago. States that she noticed redness and swelling beginning 4 days ago which worsened and became very painful. She came to the ED because she felt she had an infection which was not going to resolve spontaneously. Reports last heroin use 6/15 and also took methadone which she buys from a friend. CT scan of the neck showed the following: lateral to the right sternocleidomastoid muscle, there is an approximately 3.4 x 2.5 x 2.7 cm and contains collection, compatible with phlegmon/developing abscess. Patient admitted to surgical service, started on IV antibiotics, and then underwent incision and drainage of right neck abscess, culture sent. Patient tolerated the procedure well, without complication. At this time, patient is currently ambulating, voiding, tolerating a regular diet. Pain well controlled on PO pain meds. Patient has been deemed stable for discharge home with follow up as an outpatient. This patient is a 30 year old lady with history of heroin abuse, who presented to [facility] ED on 6/15/21 with right neck swelling. Patient reports having a friend of hers inject her with heroin in the neck 7 days ago. States that she noticed redness and swelling beginning 4 days ago which worsened and became very painful. She came to the ED because she felt she had an infection which was not going to resolve spontaneously. Reports last heroin use 6/15 and also took methadone which she buys from a friend. CT scan of the neck showed the following: lateral to the right sternocleidomastoid muscle, there is an approximately 3.4 x 2.5 x 2.7 cm and contains collection, compatible with phlegmon/developing abscess. Patient admitted to surgical service, started on IV antibiotics, and then underwent incision and drainage of right neck abscess, culture sent. Patient tolerated the procedure well, without complication. At this time, patient is currently ambulating, voiding, tolerating a regular diet. Pain well controlled on PO pain meds. Patient has been deemed stable for discharge home with follow up as an outpatient.

## 2021-06-17 NOTE — BH CONSULTATION LIAISON ASSESSMENT NOTE - RISK ASSESSMENT
risk: substance use  Protective: no SI or HI, no SA, no intp psych hospitalizations, future oriented

## 2021-06-17 NOTE — DISCHARGE NOTE NURSING/CASE MANAGEMENT/SOCIAL WORK - NSDCPECAREGIVERED_GEN_ALL_CORE
carenotes on abscess, carenotes on d/c medication, managing pain handout carenotes on abscess, d/c medications, pain handout, caring for my incision action plan Medline and carenotes for surgical procedure I&D, abscess, Cipro, as well as DC Medications and side effects literature for patient reference./Yes

## 2021-06-17 NOTE — DISCHARGE NOTE NURSING/CASE MANAGEMENT/SOCIAL WORK - NSDPDISTO_GEN_ALL_CORE
Home stable, right neck dressing in place clean dry and intact, tolerating diet, oob voiding well/Home with home care Right neck Dressing with 1/4 inch packing, CDI. VS stable, Afebrile, aileen po diet. Voiding.

## 2021-06-17 NOTE — BH CONSULTATION LIAISON ASSESSMENT NOTE - CURRENT MEDICATION
MEDICATIONS  (STANDING):  clindamycin IVPB 600 milliGRAM(s) IV Intermittent every 8 hours  clindamycin IVPB      enoxaparin Injectable 40 milliGRAM(s) SubCutaneous every 24 hours  LORazepam     Tablet   Oral   LORazepam     Tablet 4 milliGRAM(s) Oral every 4 hours  piperacillin/tazobactam IVPB.. 3.375 Gram(s) IV Intermittent every 8 hours    MEDICATIONS  (PRN):  acetaminophen   Tablet .. 650 milliGRAM(s) Oral every 6 hours PRN Mild Pain (1 - 3)  HYDROmorphone  Injectable 0.5 milliGRAM(s) IV Push every 3 hours PRN Moderate Pain (4 - 6)  HYDROmorphone  Injectable 1 milliGRAM(s) IV Push every 3 hours PRN Severe Pain (7 - 10)  LORazepam     Tablet 2 milliGRAM(s) Oral every 2 hours PRN Symptom-triggered 2 point increase in CIWA-Ar  LORazepam   Injectable 2 milliGRAM(s) IV Push every 1 hour PRN Symptom-triggered: each CIWA -Ar score 8 or GREATER

## 2021-06-17 NOTE — PROGRESS NOTE ADULT - SUBJECTIVE AND OBJECTIVE BOX
HPI:  P/w neck abscess s/p I&D bedside    24h Events:   - CIWA protocol initiated on floors  - Overnight, no acute events    Subjective:   Patient examined at bedside this AM. Reports     Objective:  Vital Signs  T(C): 37.2 (06-16 @ 22:09), Max: 37.2 (06-16 @ 22:09)  HR: 98 (06-16 @ 22:09) (63 - 98)  BP: 120/73 (06-16 @ 22:09) (97/59 - 126/83)  RR: 17 (06-16 @ 22:09) (16 - 18)  SpO2: 98% (06-16 @ 22:09) (98% - 100%)    Physical Exam:  General: alert and oriented, NAD  Resp: airway patent, respirations unlabored  CVS: regular rate and rhythm  Abdomen: soft, nontender, nondistended  Extremities: no edema  Skin: warm, dry, appropriate color    Labs:                        12.8   18.34 )-----------( 103      ( 16 Jun 2021 06:26 )             39.5   06-16    139  |  107  |  7   ----------------------------<  103<H>  4.0   |  22  |  0.70    Ca    8.9      16 Jun 2021 06:26  Phos  2.7     06-16  Mg     1.7     06-16      CAPILLARY BLOOD GLUCOSE          Microbiology:      Medications:   MEDICATIONS  (STANDING):  clindamycin IVPB 600 milliGRAM(s) IV Intermittent every 8 hours  clindamycin IVPB      enoxaparin Injectable 40 milliGRAM(s) SubCutaneous every 24 hours  piperacillin/tazobactam IVPB.. 3.375 Gram(s) IV Intermittent every 8 hours    MEDICATIONS  (PRN):  acetaminophen   Tablet .. 650 milliGRAM(s) Oral every 6 hours PRN Mild Pain (1 - 3)  HYDROmorphone  Injectable 0.5 milliGRAM(s) IV Push every 3 hours PRN Moderate Pain (4 - 6)  HYDROmorphone  Injectable 1 milliGRAM(s) IV Push every 3 hours PRN Severe Pain (7 - 10)  LORazepam     Tablet 2 milliGRAM(s) Oral every 2 hours PRN Symptom-triggered 2 point increase in CIWA-Ar       HPI:  P/w neck abscess s/p I&D bedside    24h Events:   - CIWA protocol initiated on floors  - Overnight, no acute events    Subjective:   Patient examined at bedside this AM. States she is feeling a little better. Incision is tender to palpation. Denies F/chills    Objective:  Vital Signs Last 24 Hrs  T(C): 37.4 (17 Jun 2021 02:23), Max: 37.4 (17 Jun 2021 02:23)  T(F): 99.4 (17 Jun 2021 02:23), Max: 99.4 (17 Jun 2021 02:23)  HR: 87 (17 Jun 2021 02:23) (63 - 98)  BP: 110/75 (17 Jun 2021 02:23) (97/59 - 126/83)  BP(mean): --  RR: 18 (17 Jun 2021 02:23) (17 - 18)  SpO2: 100% (17 Jun 2021 02:23) (98% - 100%)        I&O's Summary    16 Jun 2021 07:01  -  17 Jun 2021 07:00  --------------------------------------------------------  IN: 0 mL / OUT: 500 mL / NET: -500 mL        Physical Exam:  General: alert and oriented, NAD  Neck: erythema over R lateral neck, 3mm incision packed with gauze (removed and changed). Purulent drainage expressed.   Resp: airway patent, respirations unlabored  Abdomen: soft, nontender, nondistended        LABS:                          13.6   18.60 )-----------( 182      ( 17 Jun 2021 07:44 )             41.6     06-16    139  |  107  |  7   ----------------------------<  103<H>  4.0   |  22  |  0.70    Ca    8.9      16 Jun 2021 06:26  Phos  2.7     06-16  Mg     1.7     06-16        Medications:   MEDICATIONS  (STANDING):  clindamycin IVPB 600 milliGRAM(s) IV Intermittent every 8 hours  clindamycin IVPB      enoxaparin Injectable 40 milliGRAM(s) SubCutaneous every 24 hours  piperacillin/tazobactam IVPB.. 3.375 Gram(s) IV Intermittent every 8 hours    MEDICATIONS  (PRN):  acetaminophen   Tablet .. 650 milliGRAM(s) Oral every 6 hours PRN Mild Pain (1 - 3)  HYDROmorphone  Injectable 0.5 milliGRAM(s) IV Push every 3 hours PRN Moderate Pain (4 - 6)  HYDROmorphone  Injectable 1 milliGRAM(s) IV Push every 3 hours PRN Severe Pain (7 - 10)  LORazepam     Tablet 2 milliGRAM(s) Oral every 2 hours PRN Symptom-triggered 2 point increase in CIWA-Ar

## 2021-06-17 NOTE — DISCHARGE NOTE PROVIDER - NSDCFUADDAPPT_GEN_ALL_CORE_FT
Call the [facility] surgery clinic at [phone] for an appointment THIS TUESDAY. If you are unable to make an appointment, you should go to an urgent care or [facility] ED for a dressing change.

## 2021-06-17 NOTE — DISCHARGE NOTE PROVIDER - PROVIDER TOKENS
PROVIDER:[TOKEN:[[id number]],FOLLOWUP:[Routine]],PROVIDER:[TOKEN:[[id number]],FOLLOWUP:[Routine]]

## 2021-06-17 NOTE — PROGRESS NOTE ADULT - PROVIDER SPECIALTY LIST ADULT

## 2021-06-17 NOTE — BH CONSULTATION LIAISON ASSESSMENT NOTE - NSBHCHARTREVIEWLAB_PSY_A_CORE FT
13.6   18.60 )-----------( 182      ( 17 Jun 2021 07:44 )             41.6   06-16    139  |  107  |  7   ----------------------------<  103<H>  4.0   |  22  |  0.70    Ca    8.9      16 Jun 2021 06:26  Phos  2.7     06-16  Mg     1.7     06-16

## 2021-06-17 NOTE — DISCHARGE NOTE PROVIDER - CARE PROVIDER_API CALL
Didier Zhong)  Surgery; Surgical Critical Care  [address] [place] [address]  Phone: [phone]  Fax: [phone]  Follow Up Time: Routine    Jamir Childers)  Surgery; Surgical Critical Care  [address] [facility] [facility], [address]  [place] [address]  Phone: [phone]  Fax: [phone]  Follow Up Time: Routine

## 2021-06-18 VITALS
RESPIRATION RATE: 18 BRPM | HEART RATE: 72 BPM | DIASTOLIC BLOOD PRESSURE: 68 MMHG | TEMPERATURE: 98 F | SYSTOLIC BLOOD PRESSURE: 116 MMHG | OXYGEN SATURATION: 99 %

## 2021-06-18 DIAGNOSIS — F11.20 OPIOID DEPENDENCE, UNCOMPLICATED: ICD-10-CM

## 2021-06-18 LAB
-  AMIKACIN: SIGNIFICANT CHANGE UP
-  AMOXICILLIN/CLAVULANIC ACID: SIGNIFICANT CHANGE UP
-  AMPICILLIN/SULBACTAM: SIGNIFICANT CHANGE UP
-  AMPICILLIN: SIGNIFICANT CHANGE UP
-  AZTREONAM: SIGNIFICANT CHANGE UP
-  CEFAZOLIN: SIGNIFICANT CHANGE UP
-  CEFEPIME: SIGNIFICANT CHANGE UP
-  CEFOXITIN: SIGNIFICANT CHANGE UP
-  CEFTRIAXONE: SIGNIFICANT CHANGE UP
-  CIPROFLOXACIN: SIGNIFICANT CHANGE UP
-  ERTAPENEM: SIGNIFICANT CHANGE UP
-  GENTAMICIN: SIGNIFICANT CHANGE UP
-  LEVOFLOXACIN: SIGNIFICANT CHANGE UP
-  MEROPENEM: SIGNIFICANT CHANGE UP
-  PIPERACILLIN/TAZOBACTAM: SIGNIFICANT CHANGE UP
-  TOBRAMYCIN: SIGNIFICANT CHANGE UP
-  TRIMETHOPRIM/SULFAMETHOXAZOLE: SIGNIFICANT CHANGE UP
ANION GAP SERPL CALC-SCNC: 15 MMOL/L — HIGH (ref 7–14)
BUN SERPL-MCNC: 12 MG/DL — SIGNIFICANT CHANGE UP (ref 7–23)
CALCIUM SERPL-MCNC: 9.4 MG/DL — SIGNIFICANT CHANGE UP (ref 8.4–10.5)
CHLORIDE SERPL-SCNC: 101 MMOL/L — SIGNIFICANT CHANGE UP (ref 98–107)
CO2 SERPL-SCNC: 20 MMOL/L — LOW (ref 22–31)
CREAT SERPL-MCNC: 0.83 MG/DL — SIGNIFICANT CHANGE UP (ref 0.5–1.3)
GLUCOSE SERPL-MCNC: 84 MG/DL — SIGNIFICANT CHANGE UP (ref 70–99)
HCT VFR BLD CALC: 39.3 % — SIGNIFICANT CHANGE UP (ref 34.5–45)
HCT VFR BLD CALC: 41.4 % — SIGNIFICANT CHANGE UP (ref 34.5–45)
HGB BLD-MCNC: 12.8 G/DL — SIGNIFICANT CHANGE UP (ref 11.5–15.5)
HGB BLD-MCNC: 13.8 G/DL — SIGNIFICANT CHANGE UP (ref 11.5–15.5)
MAGNESIUM SERPL-MCNC: 2 MG/DL — SIGNIFICANT CHANGE UP (ref 1.6–2.6)
MCHC RBC-ENTMCNC: 29.5 PG — SIGNIFICANT CHANGE UP (ref 27–34)
MCHC RBC-ENTMCNC: 29.9 PG — SIGNIFICANT CHANGE UP (ref 27–34)
MCHC RBC-ENTMCNC: 32.6 GM/DL — SIGNIFICANT CHANGE UP (ref 32–36)
MCHC RBC-ENTMCNC: 33.3 GM/DL — SIGNIFICANT CHANGE UP (ref 32–36)
MCV RBC AUTO: 89.8 FL — SIGNIFICANT CHANGE UP (ref 80–100)
MCV RBC AUTO: 90.6 FL — SIGNIFICANT CHANGE UP (ref 80–100)
METHOD TYPE: SIGNIFICANT CHANGE UP
NRBC # BLD: 0 /100 WBCS — SIGNIFICANT CHANGE UP
NRBC # BLD: 0 /100 WBCS — SIGNIFICANT CHANGE UP
NRBC # FLD: 0 K/UL — SIGNIFICANT CHANGE UP
NRBC # FLD: 0 K/UL — SIGNIFICANT CHANGE UP
PHOSPHATE SERPL-MCNC: 4.6 MG/DL — HIGH (ref 2.5–4.5)
PLATELET # BLD AUTO: 186 K/UL — SIGNIFICANT CHANGE UP (ref 150–400)
PLATELET # BLD AUTO: 199 K/UL — SIGNIFICANT CHANGE UP (ref 150–400)
POTASSIUM SERPL-MCNC: 3.6 MMOL/L — SIGNIFICANT CHANGE UP (ref 3.5–5.3)
POTASSIUM SERPL-SCNC: 3.6 MMOL/L — SIGNIFICANT CHANGE UP (ref 3.5–5.3)
RBC # BLD: 4.34 M/UL — SIGNIFICANT CHANGE UP (ref 3.8–5.2)
RBC # BLD: 4.61 M/UL — SIGNIFICANT CHANGE UP (ref 3.8–5.2)
RBC # FLD: 13.2 % — SIGNIFICANT CHANGE UP (ref 10.3–14.5)
RBC # FLD: 13.4 % — SIGNIFICANT CHANGE UP (ref 10.3–14.5)
SODIUM SERPL-SCNC: 136 MMOL/L — SIGNIFICANT CHANGE UP (ref 135–145)
WBC # BLD: 11.4 K/UL — HIGH (ref 3.8–10.5)
WBC # BLD: 11.95 K/UL — HIGH (ref 3.8–10.5)
WBC # FLD AUTO: 11.4 K/UL — HIGH (ref 3.8–10.5)
WBC # FLD AUTO: 11.95 K/UL — HIGH (ref 3.8–10.5)

## 2021-06-18 RX ORDER — CIPROFLOXACIN LACTATE 400MG/40ML
1 VIAL (ML) INTRAVENOUS
Qty: 20 | Refills: 0
Start: 2021-06-18 | End: 2021-06-27

## 2021-06-18 RX ORDER — POTASSIUM CHLORIDE 20 MEQ
40 PACKET (EA) ORAL ONCE
Refills: 0 | Status: COMPLETED | OUTPATIENT
Start: 2021-06-18 | End: 2021-06-18

## 2021-06-18 RX ADMIN — OXYCODONE HYDROCHLORIDE 10 MILLIGRAM(S): 5 TABLET ORAL at 07:00

## 2021-06-18 RX ADMIN — Medication 975 MILLIGRAM(S): at 06:21

## 2021-06-18 RX ADMIN — Medication 105 MILLIGRAM(S): at 06:22

## 2021-06-18 RX ADMIN — OXYCODONE HYDROCHLORIDE 10 MILLIGRAM(S): 5 TABLET ORAL at 06:30

## 2021-06-18 RX ADMIN — PIPERACILLIN AND TAZOBACTAM 25 GRAM(S): 4; .5 INJECTION, POWDER, LYOPHILIZED, FOR SOLUTION INTRAVENOUS at 06:22

## 2021-06-18 RX ADMIN — Medication 40 MILLIEQUIVALENT(S): at 10:41

## 2021-06-18 RX ADMIN — OXYCODONE HYDROCHLORIDE 10 MILLIGRAM(S): 5 TABLET ORAL at 11:39

## 2021-06-18 RX ADMIN — OXYCODONE HYDROCHLORIDE 10 MILLIGRAM(S): 5 TABLET ORAL at 10:39

## 2021-06-18 RX ADMIN — OXYCODONE HYDROCHLORIDE 10 MILLIGRAM(S): 5 TABLET ORAL at 00:10

## 2021-06-18 RX ADMIN — ENOXAPARIN SODIUM 40 MILLIGRAM(S): 100 INJECTION SUBCUTANEOUS at 06:21

## 2021-06-18 RX ADMIN — PIPERACILLIN AND TAZOBACTAM 25 GRAM(S): 4; .5 INJECTION, POWDER, LYOPHILIZED, FOR SOLUTION INTRAVENOUS at 14:46

## 2021-06-18 RX ADMIN — Medication 2 MILLIGRAM(S): at 13:53

## 2021-06-18 RX ADMIN — OXYCODONE HYDROCHLORIDE 10 MILLIGRAM(S): 5 TABLET ORAL at 00:25

## 2021-06-18 RX ADMIN — Medication 100 MILLIGRAM(S): at 10:41

## 2021-06-18 RX ADMIN — Medication 100 MILLIGRAM(S): at 02:57

## 2021-06-18 RX ADMIN — Medication 105 MILLIGRAM(S): at 13:26

## 2021-06-18 NOTE — PROGRESS NOTE ADULT - SUBJECTIVE AND OBJECTIVE BOX
HPI:  P/w neck abscess s/p I&D bedside    24h Events:   - Overnight, changed saturated outer dressing    Subjective:   Patient examined at bedside this AM. Reports     Objective:  Vital Signs  T(C): 36.9 (06-18 @ 01:09), Max: 37.4 (06-17 @ 02:23)  HR: 71 (06-18 @ 01:09) (66 - 89)  BP: 110/75 (06-18 @ 01:09) (106/66 - 114/71)  RR: 16 (06-18 @ 01:09) (16 - 18)  SpO2: 99% (06-18 @ 01:09) (96% - 100%)  06-16-21 @ 07:01  -  06-17-21 @ 07:00  --------------------------------------------------------  IN:  Total IN: 0 mL    OUT:    Voided (mL): 500 mL  Total OUT: 500 mL    Total NET: -500 mL    Physical Exam:  General: alert and oriented, NAD  Neck: erythema over R lateral neck, 3mm incision packed with gauze (removed and changed). Purulent drainage expressed.   Resp: airway patent, respirations unlabored  Abdomen: soft, nontender, nondistended    Labs:                        13.6   18.60 )-----------( 182      ( 17 Jun 2021 07:44 )             41.6   06-16    139  |  107  |  7   ----------------------------<  103<H>  4.0   |  22  |  0.70    Ca    8.9      16 Jun 2021 06:26  Phos  2.7     06-16  Mg     1.7     06-16      CAPILLARY BLOOD GLUCOSE          Microbiology:    Culture - Abscess with Gram Stain (collected 16 Jun 2021 06:47)  Source: .Abscess Right neck  Preliminary Report (17 Jun 2021 12:22):    Moderate Serratia marcescens        Medications:   MEDICATIONS  (STANDING):  acetaminophen   Tablet .. 975 milliGRAM(s) Oral every 6 hours  clindamycin IVPB      clindamycin IVPB 600 milliGRAM(s) IV Intermittent every 8 hours  enoxaparin Injectable 40 milliGRAM(s) SubCutaneous every 24 hours  piperacillin/tazobactam IVPB.. 3.375 Gram(s) IV Intermittent every 8 hours  QUEtiapine 50 milliGRAM(s) Oral at bedtime  thiamine IVPB 500 milliGRAM(s) IV Intermittent three times a day    MEDICATIONS  (PRN):  ibuprofen  Tablet. 400 milliGRAM(s) Oral every 6 hours PRN Mild Pain (1 - 3)  LORazepam     Tablet 2 milliGRAM(s) Oral every 2 hours PRN Symptom-triggered 2 point increase in CIWA-Ar  oxyCODONE    IR 5 milliGRAM(s) Oral every 4 hours PRN Moderate Pain (4 - 6)  oxyCODONE    IR 10 milliGRAM(s) Oral every 4 hours PRN Severe Pain (7 - 10)  QUEtiapine 25 milliGRAM(s) Oral every 6 hours PRN anxiety       HPI:  P/w neck abscess s/p I&D bedside    24h Events:   - Overnight, changed saturated outer dressing    Subjective:   Patient examined at bedside this AM. No acute events. Denies fever, chills.      Objective:  Vital Signs  T(C): 36.9 (06-18 @ 01:09), Max: 37.4 (06-17 @ 02:23)  HR: 71 (06-18 @ 01:09) (66 - 89)  BP: 110/75 (06-18 @ 01:09) (106/66 - 114/71)  RR: 16 (06-18 @ 01:09) (16 - 18)  SpO2: 99% (06-18 @ 01:09) (96% - 100%)  06-16-21 @ 07:01  -  06-17-21 @ 07:00  --------------------------------------------------------  IN:  Total IN: 0 mL    OUT:    Voided (mL): 500 mL  Total OUT: 500 mL    Total NET: -500 mL    Physical Exam:  General: alert and oriented, NAD  Neck: erythema over R lateral neck, 3mm incision packed with gauze (removed and changed). Purulent drainage expressed.   Resp: respirations unlabored  Abdomen: soft, nontender, nondistended    Labs:                        13.6   18.60 )-----------( 182      ( 17 Jun 2021 07:44 )             41.6   06-16    139  |  107  |  7   ----------------------------<  103<H>  4.0   |  22  |  0.70    Ca    8.9      16 Jun 2021 06:26  Phos  2.7     06-16  Mg     1.7     06-16      CAPILLARY BLOOD GLUCOSE          Microbiology:    Culture - Abscess with Gram Stain (collected 16 Jun 2021 06:47)  Source: .Abscess Right neck  Preliminary Report (17 Jun 2021 12:22):    Moderate Serratia marcescens        Medications:   MEDICATIONS  (STANDING):  acetaminophen   Tablet .. 975 milliGRAM(s) Oral every 6 hours  clindamycin IVPB      clindamycin IVPB 600 milliGRAM(s) IV Intermittent every 8 hours  enoxaparin Injectable 40 milliGRAM(s) SubCutaneous every 24 hours  piperacillin/tazobactam IVPB.. 3.375 Gram(s) IV Intermittent every 8 hours  QUEtiapine 50 milliGRAM(s) Oral at bedtime  thiamine IVPB 500 milliGRAM(s) IV Intermittent three times a day    MEDICATIONS  (PRN):  ibuprofen  Tablet. 400 milliGRAM(s) Oral every 6 hours PRN Mild Pain (1 - 3)  LORazepam     Tablet 2 milliGRAM(s) Oral every 2 hours PRN Symptom-triggered 2 point increase in CIWA-Ar  oxyCODONE    IR 5 milliGRAM(s) Oral every 4 hours PRN Moderate Pain (4 - 6)  oxyCODONE    IR 10 milliGRAM(s) Oral every 4 hours PRN Severe Pain (7 - 10)  QUEtiapine 25 milliGRAM(s) Oral every 6 hours PRN anxiety

## 2021-06-21 LAB
CULTURE RESULTS: SIGNIFICANT CHANGE UP
ORGANISM # SPEC MICROSCOPIC CNT: SIGNIFICANT CHANGE UP
ORGANISM # SPEC MICROSCOPIC CNT: SIGNIFICANT CHANGE UP
SPECIMEN SOURCE: SIGNIFICANT CHANGE UP

## 2021-06-21 NOTE — CHART NOTE - NSCHARTNOTEFT_GEN_A_CORE
Per Surgical team patient was taught dressing change and was given supplies by resident prior to discharge on 6/18.

## 2021-07-02 NOTE — SBIRT NOTE ADULT - NSSBIRTDRGPASSREFTXDET_GEN_A_CORE
Provided SBIRT services: Full screen positive. Referral to Treatment Performed. Screening results were reviewed with the patient and patient was provided information about healthy guidelines and potential negative consequences associated with level of risk. Motivation and readiness to reduce or stop use was discussed and goals and activities to make changes were suggested/offered.  Referral for complete assessment and level of care determination at a certified treatment facility was completed by contacting the treatment facility via phone, and add apt info as noted below:  Email sent to [facility] for outpatient follow-up.  Inpatient referral started to [facility].   Provided SBIRT services: Full screen positive. Referral to Treatment Performed. Screening results were reviewed with the patient and patient was provided information about healthy guidelines and potential negative consequences associated with level of risk. Motivation and readiness to reduce or stop use was discussed and goals and activities to make changes were suggested/offered.  Referral for complete assessment and level of care determination at a certified treatment facility was completed by contacting the treatment facility via phone, and add apt info as noted below:  Email sent to [facility] for outpatient follow-up. Patient provided with MAT navigation packet for walk in hours 7am-10am Friday morning 6/18.  Inpatient referral sent to [facility] [phone] and [facility]; [address], [place], [place] [address] [phone]; Pt enrolled in [employer] through [facility] for follow up and peer support.    Bilobed Transposition Flap Text: The defect edges were debeveled with a #15 scalpel blade.  Given the location of the defect and the proximity to free margins a bilobed transposition flap was deemed most appropriate.  Using a sterile surgical marker, an appropriate bilobe flap drawn around the defect.    The area thus outlined was incised deep to adipose tissue with a #15 scalpel blade.  The skin margins were undermined to an appropriate distance in all directions utilizing iris scissors.

## 2021-07-07 NOTE — DISCHARGE NOTE NURSING/CASE MANAGEMENT/SOCIAL WORK - NSDCFUADDAPPT_GEN_ALL_CORE_FT
Alert and interactive, no focal deficits PLEASE ATTEND: [facility] for outpatient follow-up *WALK IN HOURS: 7am-10am Friday 6-*    WHEN YOU ARE READY TO ENTER INTO INPATIENT SUBSTANCE ABUSE REHAB PLEASE CALL YOUR PROJECT CONNECT CARE COORDINATOR - [facility] ([phone]) AND SHE WILL ASSIST YOU.  PLEASE ATTEND: [facility] Program for outpatient follow-up *WALK IN HOURS: 7am-10am Friday 6-*    WHEN YOU ARE READY TO ENTER INTO INPATIENT SUBSTANCE ABUSE REHAB PLEASE CALL YOUR PRO[employer] CARE COORDINATOR - [facility] ([phone]) AND SHE WILL ASSIST YOU.   Follow-up with Dr. Zhong or Dr. Childers in the office as instructed for post-op check as well as with PMD in one week for continuity of care. Bring Discharge paperwork with you.

## 2022-04-19 NOTE — SBIRT NOTE ADULT - NSSBIRTDRGSTOPUSE_GEN_A_CORE
[employer]. [address] [place] [address], [place] [address]. [id number] [employer]. [address] [place] [address]. All rights reserved. This information is not intended as a substitute for professional medical care. Always follow your healthcare professional's instructions.         Patient Information    Radiology -- Please go to X-ray now to have your test performed.     Follow Up  -- Follow up with your regular Primary Care Provider.   -- You have been referred to UROLOGY [phone]  ORTHOPEDICS [phone]  PHYSICAL THERAPY [phone]  . Please call if you have not heard from that department in 3 days.     Additional Educational Resources:  For additional resources regarding your symptoms, diagnosis, or further health information, please visit the Health Resources section on [email or web address] or the Online Health Resources section in [employer].       No

## 2022-07-22 NOTE — PROGRESS NOTE ADULT - ASSESSMENT
30 year old lady with history of heroin abuse, presenting with right neck abscess s/p heroin injection to neck 7 days ago    PLAN:  - IV Clinda/Zosyn  - Will plan to dc with 7 day total course of abx (clinda/augmentin)   - CIWA  - RD  - Pain control PRN  - DVT ppx  - D/c today after dressing change, AM CBC    B team surgery  [id number] 30 year old lady with history of heroin abuse, presenting with right neck abscess s/p heroin injection to neck 7 days ago    PLAN:  -Regular Diet   -continue IV Clinda/Zosyn while inpatient   - Will plan to dc with 7 day total course of abx (augmentin)   - CIWA  - Pain control PRN  - DVT ppx  - D/c today if WBC downtrending     B team surgery  [id number] 30 year old lady with history of heroin abuse, presenting with right neck abscess s/p heroin injection to neck 7 days ago    PLAN:  -Regular Diet   -continue IV Clinda/Zosyn while inpatient   - Will plan to dc with 7 day total course of abx (augmentin)   - CIWA  - Pain control PRN  - DVT ppx  - D/c today if WBC downtrending w/ VNS for packing     B team surgery  [id number] I have personally seen and examined the patient. I have collaborated with and supervised the

## 2022-11-08 NOTE — ED PROVIDER NOTE - NS ED MD DISPO DIVISION
How Many Skin Cancers Have You Had?: more than one What Is The Reason For Today's Visit?: History of Non-Melanoma Skin Cancer When Was Your Last Cancer Diagnosed?: 2022 [place]

## 2023-02-20 ENCOUNTER — EMERGENCY (EMERGENCY)
Facility: HOSPITAL | Age: 33
LOS: 1 days | Discharge: HOME | End: 2023-02-20
Admitting: EMERGENCY MEDICINE
Payer: MEDICAID

## 2023-02-20 VITALS
HEART RATE: 67 BPM | DIASTOLIC BLOOD PRESSURE: 90 MMHG | SYSTOLIC BLOOD PRESSURE: 140 MMHG | TEMPERATURE: 98 F | OXYGEN SATURATION: 100 % | RESPIRATION RATE: 17 BRPM

## 2023-02-20 DIAGNOSIS — F43.20 ADJUSTMENT DISORDER, UNSPECIFIED: ICD-10-CM

## 2023-02-20 DIAGNOSIS — F11.20 OPIOID DEPENDENCE, UNCOMPLICATED: ICD-10-CM

## 2023-02-20 LAB
ALBUMIN SERPL ELPH-MCNC: 4.9 G/DL — SIGNIFICANT CHANGE UP (ref 3.3–5)
ALP SERPL-CCNC: 54 U/L — SIGNIFICANT CHANGE UP (ref 40–120)
ALT FLD-CCNC: 16 U/L — SIGNIFICANT CHANGE UP (ref 4–33)
AMPHET UR-MCNC: NEGATIVE — SIGNIFICANT CHANGE UP
ANION GAP SERPL CALC-SCNC: 12 MMOL/L — SIGNIFICANT CHANGE UP (ref 7–14)
APAP SERPL-MCNC: <10 UG/ML — LOW (ref 15–25)
APPEARANCE UR: ABNORMAL
AST SERPL-CCNC: 16 U/L — SIGNIFICANT CHANGE UP (ref 4–32)
BACTERIA # UR AUTO: ABNORMAL
BARBITURATES UR SCN-MCNC: NEGATIVE — SIGNIFICANT CHANGE UP
BASOPHILS # BLD AUTO: 0.05 K/UL — SIGNIFICANT CHANGE UP (ref 0–0.2)
BASOPHILS NFR BLD AUTO: 0.5 % — SIGNIFICANT CHANGE UP (ref 0–2)
BENZODIAZ UR-MCNC: NEGATIVE — SIGNIFICANT CHANGE UP
BILIRUB SERPL-MCNC: 0.4 MG/DL — SIGNIFICANT CHANGE UP (ref 0.2–1.2)
BILIRUB UR-MCNC: NEGATIVE — SIGNIFICANT CHANGE UP
BUN SERPL-MCNC: 11 MG/DL — SIGNIFICANT CHANGE UP (ref 7–23)
CALCIUM SERPL-MCNC: 9.7 MG/DL — SIGNIFICANT CHANGE UP (ref 8.4–10.5)
CHLORIDE SERPL-SCNC: 102 MMOL/L — SIGNIFICANT CHANGE UP (ref 98–107)
CO2 SERPL-SCNC: 25 MMOL/L — SIGNIFICANT CHANGE UP (ref 22–31)
COCAINE METAB.OTHER UR-MCNC: NEGATIVE — SIGNIFICANT CHANGE UP
COLOR SPEC: YELLOW — SIGNIFICANT CHANGE UP
CREAT SERPL-MCNC: 0.82 MG/DL — SIGNIFICANT CHANGE UP (ref 0.5–1.3)
CREATININE URINE RESULT, DAU: 188 MG/DL — SIGNIFICANT CHANGE UP
DIFF PNL FLD: NEGATIVE — SIGNIFICANT CHANGE UP
EGFR: 97 ML/MIN/1.73M2 — SIGNIFICANT CHANGE UP
EOSINOPHIL # BLD AUTO: 0.22 K/UL — SIGNIFICANT CHANGE UP (ref 0–0.5)
EOSINOPHIL NFR BLD AUTO: 2.4 % — SIGNIFICANT CHANGE UP (ref 0–6)
EPI CELLS # UR: SIGNIFICANT CHANGE UP /HPF (ref 0–5)
ETHANOL SERPL-MCNC: <10 MG/DL — SIGNIFICANT CHANGE UP
GLUCOSE SERPL-MCNC: 88 MG/DL — SIGNIFICANT CHANGE UP (ref 70–99)
GLUCOSE UR QL: NEGATIVE — SIGNIFICANT CHANGE UP
HCG SERPL-ACNC: <5 MIU/ML — SIGNIFICANT CHANGE UP
HCT VFR BLD CALC: 40.5 % — SIGNIFICANT CHANGE UP (ref 34.5–45)
HGB BLD-MCNC: 13 G/DL — SIGNIFICANT CHANGE UP (ref 11.5–15.5)
IANC: 6.04 K/UL — SIGNIFICANT CHANGE UP (ref 1.8–7.4)
IMM GRANULOCYTES NFR BLD AUTO: 0.3 % — SIGNIFICANT CHANGE UP (ref 0–0.9)
KETONES UR-MCNC: NEGATIVE — SIGNIFICANT CHANGE UP
LEUKOCYTE ESTERASE UR-ACNC: NEGATIVE — SIGNIFICANT CHANGE UP
LYMPHOCYTES # BLD AUTO: 2.44 K/UL — SIGNIFICANT CHANGE UP (ref 1–3.3)
LYMPHOCYTES # BLD AUTO: 26.1 % — SIGNIFICANT CHANGE UP (ref 13–44)
MCHC RBC-ENTMCNC: 29.5 PG — SIGNIFICANT CHANGE UP (ref 27–34)
MCHC RBC-ENTMCNC: 32.1 GM/DL — SIGNIFICANT CHANGE UP (ref 32–36)
MCV RBC AUTO: 92 FL — SIGNIFICANT CHANGE UP (ref 80–100)
METHADONE UR-MCNC: POSITIVE
MONOCYTES # BLD AUTO: 0.57 K/UL — SIGNIFICANT CHANGE UP (ref 0–0.9)
MONOCYTES NFR BLD AUTO: 6.1 % — SIGNIFICANT CHANGE UP (ref 2–14)
NEUTROPHILS # BLD AUTO: 6.04 K/UL — SIGNIFICANT CHANGE UP (ref 1.8–7.4)
NEUTROPHILS NFR BLD AUTO: 64.6 % — SIGNIFICANT CHANGE UP (ref 43–77)
NITRITE UR-MCNC: POSITIVE
NRBC # BLD: 0 /100 WBCS — SIGNIFICANT CHANGE UP (ref 0–0)
NRBC # FLD: 0 K/UL — SIGNIFICANT CHANGE UP (ref 0–0)
OPIATES UR-MCNC: POSITIVE
OXYCODONE UR-MCNC: NEGATIVE — SIGNIFICANT CHANGE UP
PCP SPEC-MCNC: SIGNIFICANT CHANGE UP
PCP UR-MCNC: NEGATIVE — SIGNIFICANT CHANGE UP
PH UR: 6 — SIGNIFICANT CHANGE UP (ref 5–8)
PLATELET # BLD AUTO: 269 K/UL — SIGNIFICANT CHANGE UP (ref 150–400)
POTASSIUM SERPL-MCNC: 3.8 MMOL/L — SIGNIFICANT CHANGE UP (ref 3.5–5.3)
POTASSIUM SERPL-SCNC: 3.8 MMOL/L — SIGNIFICANT CHANGE UP (ref 3.5–5.3)
PROT SERPL-MCNC: 7.4 G/DL — SIGNIFICANT CHANGE UP (ref 6–8.3)
PROT UR-MCNC: ABNORMAL
RAPID RVP RESULT: SIGNIFICANT CHANGE UP
RBC # BLD: 4.4 M/UL — SIGNIFICANT CHANGE UP (ref 3.8–5.2)
RBC # FLD: 12.6 % — SIGNIFICANT CHANGE UP (ref 10.3–14.5)
RBC CASTS # UR COMP ASSIST: 0 /HPF — SIGNIFICANT CHANGE UP (ref 0–4)
SALICYLATES SERPL-MCNC: <0.3 MG/DL — LOW (ref 15–30)
SARS-COV-2 RNA SPEC QL NAA+PROBE: SIGNIFICANT CHANGE UP
SODIUM SERPL-SCNC: 139 MMOL/L — SIGNIFICANT CHANGE UP (ref 135–145)
SP GR SPEC: 1.03 — SIGNIFICANT CHANGE UP (ref 1.01–1.05)
THC UR QL: NEGATIVE — SIGNIFICANT CHANGE UP
TOXICOLOGY SCREEN, DRUGS OF ABUSE, SERUM RESULT: SIGNIFICANT CHANGE UP
TSH SERPL-MCNC: 1.04 UIU/ML — SIGNIFICANT CHANGE UP (ref 0.27–4.2)
UROBILINOGEN FLD QL: ABNORMAL
WBC # BLD: 9.35 K/UL — SIGNIFICANT CHANGE UP (ref 3.8–10.5)
WBC # FLD AUTO: 9.35 K/UL — SIGNIFICANT CHANGE UP (ref 3.8–10.5)
WBC UR QL: SIGNIFICANT CHANGE UP /HPF (ref 0–5)

## 2023-02-20 PROCEDURE — 99285 EMERGENCY DEPT VISIT HI MDM: CPT

## 2023-02-20 RX ORDER — HYDROXYZINE HCL 10 MG
50 TABLET ORAL ONCE
Refills: 0 | Status: COMPLETED | OUTPATIENT
Start: 2023-02-20 | End: 2023-02-20

## 2023-02-20 RX ORDER — CEPHALEXIN 500 MG
500 CAPSULE ORAL ONCE
Refills: 0 | Status: COMPLETED | OUTPATIENT
Start: 2023-02-20 | End: 2023-02-20

## 2023-02-20 RX ORDER — CEPHALEXIN 500 MG
1 CAPSULE ORAL
Qty: 14 | Refills: 0
Start: 2023-02-20 | End: 2023-02-26

## 2023-02-20 RX ADMIN — Medication 500 MILLIGRAM(S): at 19:33

## 2023-02-20 RX ADMIN — Medication 50 MILLIGRAM(S): at 19:16

## 2023-02-20 NOTE — ED PROVIDER NOTE - CLINICAL SUMMARY MEDICAL DECISION MAKING FREE TEXT BOX
31 y/o F hx Bipolar D/O, Polysubstance Abuse   Labs, Urine Tox/UA, EKG  Medical evaluation performed. There is no clinical evidence of intoxication or any acute medical problem requiring immediate intervention. Patient is awaiting psychiatric consultation. Final disposition will be determined by psychiatrist. 31 y/o F hx Bipolar D/O, Polysubstance Abuse   Labs, Urine Tox/UA, EKG. Positive Nitrite on UA, Symptomatic. Urine Culture. Keflex 500 mg BID x 7 days.  Hydration encouraged.       Medical evaluation performed. There  is no clinical evidence of intoxication or any acute medical problem requiring immediate intervention. Patient is awaiting psychiatric consultation. Final disposition will be determined by psychiatrist.

## 2023-02-20 NOTE — ED BEHAVIORAL HEALTH ASSESSMENT NOTE - SUMMARY
The patient is a 32 year old woman, single, domiciled with sister, PPH of reported schizophrenia, PTSD, substance use disorder (heroin, benzodiazepines, alcohol), 1 prior psychiatric admission at [facility] 1 year ago for suicidality, 1 hx of SA in her 20s via cutting, hx of trauma, denies legal hx, presents BIB self after reporting worsening anxiety and depression.     On initial assessment, pt reports worsening anxiety and depressive symptoms in the setting of recent death of her younger cousin. She has relapsed on heroin which she last used 2 days ago. Pt denies any SI/HI/AVH though states she has been struggling with her worsening anxiety and anger. At this time, pt's mood sx likely 2/2 underlying heroin use disorder vs. adjustment disorder (in the setting of recent death). Pt does not meet criteria for inpatient psychiatric admission at this time. The patient is a 32 year old woman, single, domiciled with sister, PPH of reported schizophrenia, PTSD, substance use disorder (heroin, benzodiazepines, alcohol), 1 prior psychiatric admission at [facility] 1 year ago for suicidality, 1 hx of SA in her 20s via cutting, hx of trauma, denies legal hx, presents BIB self after reporting worsening anxiety and depression.     On initial assessment, pt reports worsening anxiety and depressive symptoms in the setting of the recent death of her younger cousin. She has recently relapsed on heroin as a result, which she last used 2 days ago. Pt denies any SI/HI/AVH though states she has been struggling with her worsening anxiety and anger. At this time, pt's mood sx likely 2/2 underlying heroin use disorder vs. adjustment disorder (in the setting of recent death). Pt does not meet criteria for inpatient psychiatric admission at this time.

## 2023-02-20 NOTE — ED ADULT NURSE NOTE - CHIEF COMPLAINT QUOTE
Patient coming to ER complaining of anxiety, depression. Denies SI/HI at this time. Denies auditory hallucinations, visual hallucinations. Noted to be crying and attempting to bite herself in triage. Hx of schizophrenia and compliant with mediations

## 2023-02-20 NOTE — ED BEHAVIORAL HEALTH ASSESSMENT NOTE - SUICIDE ATTEMPT:
Patient is afebrile. WBC count is 10.3 with 71 neutrophils. Magnesium, phosphorus and potassium are normal. Subjectively she has less abdominal discomfort. She is passing flatus as well as small bowel movements. Abdomen is soft, nondistended with slightly hypo active, normal pitch bowel sounds. The tenderness and guarding in the left lower quadrant and left super pubic region is slightly less compared to prior exams. Calves are nontende r with me at home insane. Plan: repeat labs in a.m. Begin clear liquid no carbonated beverages with nutrition supplements. Patient will be discharged home with cyclical TPN and clear liquids for the next several weeks and follow up CT scans will determine timing of her  intervention. These issues have been discussed anything with the patient.  Yes > 3 months ago

## 2023-02-20 NOTE — ED BEHAVIORAL HEALTH ASSESSMENT NOTE - REFERRAL / APPOINTMENT DETAILS
Pt provided with referrals for [facility], [facility], and [facility]. Recommended she f/u with current outpt treatment

## 2023-02-20 NOTE — ED ADULT NURSE NOTE - OBJECTIVE STATEMENT
32 yof presents A&Ox4, ambulatory c/o depression and anxiety. States he cousin passed away last month and since then she has been increasingly sad. Had suicidal thoughts last week but denies any SI/HI at this time. PMHx anxiety and depression, states she has been complaint with her medication. Admits to using alcohol and heroin prior to arrival. Pt was biting her arms. appears to be anxious. 1 previous hospitalization at [facility].  Respirations even and unlabored. Denies any CP, SOB, N/V. No acute distress noted. Pending psych eval. Safety maintained.

## 2023-02-20 NOTE — ED BEHAVIORAL HEALTH NOTE - BEHAVIORAL HEALTH NOTE
[facility] Reference #: [id number]    Others' Prescriptions  Patient Name: Layla Sorensen YOB: 1990  Address: [address] [place] [address] Sex: Female  recent prescription  Rx Written	Rx Dispensed	Drug	Quantity	Days Supply	Prescriber Name	Prescriber Charu #	Payment Method	Dispenser  02/06/2023	02/06/2023	alprazolam 1 mg tablet	90	30	JostinCarine maderamojgan	[id number]	Insurance	[facility]    Patient Name: Layla Sorensen YOB: 1990  Address: [address] [place] [id number]: Female  recent prescription  Rx Written	Rx Dispensed	Drug	Quantity	Days Supply	Prescriber Name	Prescriber Charu #	Payment Method	Dispenser  12/27/2022	01/26/2023	alprazolam 1 mg tablet	90	30	AdaPaulina reeves	[id number]	Cash	[facility]    Patient Name: Layla Sorensen YOB: 1990  Address: [address] [place] [address] Sex: Female  past prescriptions filled   Rx Written	Rx Dispensed	Drug	Quantity	Days Supply	Prescriber Name	Prescriber Charu #	Payment Method	Dispenser  09/29/2022	10/04/2022	dextroamp-amphetamin 20 mg tab	40	20	Fiona Mon NP	[id number]	Cash	[facility]  09/29/2022	10/03/2022	alprazolam 1 mg tablet	90	30	Fiona Mon NP	[id number]	Cash	[facility]  09/07/2022	09/07/2022	dextroamp-amphetamin 20 mg tab	60	30	Octaviano Rincon	[id number]	Cash	[facility]  09/07/2022	09/07/2022	alprazolam 1 mg tablet	90	30	Octaviano Rincon	[id number]	Cash	[facility]  Reference #: [id number]    Others' Prescriptions  Patient Name: Layla Sorensen YOB: 1990  Address: [address] [place] [address] Sex: Female  recent prescription  Rx Written	Rx Dispensed	Drug	Quantity	Days Supply	Prescriber Name	Prescriber Charu #	Payment Method	Dispenser  02/06/2023	02/06/2023	alprazolam 1 mg tablet	90	30	Carine Frankelmojgan	[id number]	Insurance	[facility]    Patient Name: Layla Sorensen YOB: 1990  Address: [address] [place] [id number]: Female  recent prescription  Rx Written	Rx Dispensed	Drug	Quantity	Days Supply	Prescriber Name	Prescriber Charu #	Payment Method	Dispenser  12/27/2022	01/26/2023	alprazolam 1 mg tablet	90	30	AdaPaulina reeves	[id number]	Cash	[facility]    Patient Name: Layla Sorensen YOB: 1990  Address: [address] [place] [address] Sex: Female  past prescriptions filled   Rx Written	Rx Dispensed	Drug	Quantity	Days Supply	Prescriber Name	Prescriber Charu #	Payment Method	Dispenser  09/29/2022	10/04/2022	dextroamp-amphetamin 20 mg tab	40	20	Fiona Mon NP	[id number]	Cash	[facility]  09/29/2022	10/03/2022	alprazolam 1 mg tablet	90	30	Fiona Mon NP	[id number]	Cash	[facility]  09/07/2022	09/07/2022	dextroamp-amphetamin 20 mg tab	60	30	Octaviano Rincon	[id number]	Cash	[facility]  09/07/2022	09/07/2022	alprazolam 1 mg tablet	90	30	Octaviano Rincon	[id number]	Cash	[facility]    AS PER PSYCKES:  Substance Use Disorders Substance Use Disorders - as of 7/01/2022: No Continuity of Care after Detox to Lower Level of Care; No Utilization of Medication Assisted Treatment (MAT) for Opioid Use Disorder (OUD)     Multiple Past Behavioral Health Diagnoses: Unspecified/Other Anxiety Disorder, Panic Disorder, PTSD, ADHD, MDD, Parasomnia, Unspecified/Other Bipolar,  Unspecified/Other Depressive Disorder, SCZ, Substance-Induced Depressive Disorder     substances: Opioid related disorders, AUD, Cocaine related disorders, Sedative, hypnotic, or anxiolytic related disorders, Other psychoactive substance related disorders,   Tobacco related disorder     hx of Chronic viral hepatitis | Unspecified viral hepatitis    SERVICE: Clinic -  - Opioid Treatment Program: [facility] FROM 8/29/2022 to most recent last 1/3/2023 for Opioid dependence, uncomplicated    NO IN-PATIENT ADMISSIONS WITHIN THE PAST 5 YRS EXCEPT RECENTLY AT:  Inpatient - [facility] 9/19/2022 - 9/23/2022 Bipolar Disorder    ALL OTHER PRESENTATION DUE TO ALCOHOL WITHDRAWAL + OPIOID WITHDRAWAL    per [facility] [address] [address]/ [employer] site: NO PENDING LEGAL CASES

## 2023-02-20 NOTE — ED BEHAVIORAL HEALTH ASSESSMENT NOTE - DETAILS
declines going into details na pt to be discharged. discussed warning signs, coping strategies, protective factors

## 2023-02-20 NOTE — ED PROVIDER NOTE - PATIENT PORTAL LINK FT
You can access the FollowMyHealth Patient Portal offered by [facility] by registering at the following website: [email or web address]. By joining [facility]’s FollowMyHealth portal, you will also be able to view your health information using other applications (apps) compatible with our system.

## 2023-02-20 NOTE — ED BEHAVIORAL HEALTH ASSESSMENT NOTE - DESCRIPTION
Pt is calm and cooperative.    Vital Signs Last 24 Hrs  T(C): 36.7 (20 Feb 2023 17:13), Max: 36.7 (20 Feb 2023 17:13)  T(F): 98.1 (20 Feb 2023 17:13), Max: 98.1 (20 Feb 2023 17:13)  HR: 67 (20 Feb 2023 17:13) (67 - 67)  BP: 140/90 (20 Feb 2023 17:13) (140/90 - 140/90)  BP(mean): --  RR: 17 (20 Feb 2023 17:13) (17 - 17)  SpO2: 100% (20 Feb 2023 17:13) (100% - 100%)    Parameters below as of 20 Feb 2023 17:13  Patient On (Oxygen Delivery Method): room air pt currently using heroin, reports living with her sister none known

## 2023-02-20 NOTE — ED PROVIDER NOTE - OBJECTIVE STATEMENT
33 y/o F hx Bipolar D/O, Polysubstance Abuse presents to ER tearful  w c/o worsening depression and suicidal  ideations. Admits to multiple social stressors and inability to cope. Denies falling, punching or kicking any objects . Denies pain, SOB, fever, chills, chest/abdominal discomfort. No evidence of physical injuries, broken skin or deformities.  Admits to using heroin and alcohol this morning.

## 2023-02-20 NOTE — ED PROVIDER NOTE - CARE PLAN
1 Principal Discharge DX:	Adjustment disorder  Secondary Diagnosis:	Polysubstance abuse   Principal Discharge DX:	Adjustment disorder  Secondary Diagnosis:	Polysubstance abuse  Secondary Diagnosis:	UTI (urinary tract infection)

## 2023-02-20 NOTE — ED BEHAVIORAL HEALTH ASSESSMENT NOTE - NSBHATTESTCOMMENTATTENDFT_PSY_A_CORE
32/F with panpsychiatric diagnoses, one recent psych admission to [facility] for SI (2022), with 1 hx of SA in her 20's via cutting, hx of trauma and polysubstance abuse.  today, self presented to the ED reporting worsening anxiety and depression.     at this time, endorses feeling sad and anxious in the setting of a recent death in the family (her younger cousin).  depressive symptoms do not meet severe MDD criteria.  due to her depression and anxiety, admitted  that she had recently relapsed on heroin (last used 2 days ago). at this time, is not manifesting any signs/ symptoms suggestive of severe depression; severe anxiety; nori or psychosis. she is not harboring any passive or active SI or HI.  However, claimed that she has been struggling with her worsening anxiety and anger. differentials to entertain are as follows: adjustment disorder vs substance induced mood/ anxiety disorder.  currently, there is no justification to pursue in-Pt psych admission. Pt was able to partake towards safety planning. she may continue to follow up on an out-Pt basis.      RECOMMENDATIONS:   1. Psychoeducation provided.  Encouraged continued compliance with meds as well as follow up with OP psych services.  No indication for emergent psych meds changes at this time. Discussed important role of psychotherapy.  Lastly, discussed impact of illicit substance use on health and well being as well as the importance of sobriety   2. Emergency protocol reviewed.  Safety plan completed with Pt using the [place] Safety Plan.  Pt was adviced to call 911 or come to the nearest ED should symptoms worsen; have increasing bouts of agitation/aggressive behavior; having SI/HI; or [phone]    3. service recommended for Pt to continue follow up with her current out-Pt [facility]. though she was provided with alternative resources to the community should she entertain prospect of switching [facility]: [facility], [facility], other OP psych clinics within Pt's community like [facility] and [facility]   4. no psych meds prescribed

## 2023-02-20 NOTE — ED ADULT TRIAGE NOTE - CHIEF COMPLAINT QUOTE
Patient coming to ER complaining of anxiety, depression. Denies SI/HIK at this time. Denies auditory hallucinations, visual hallucinations. Noted to be crying and attempting to bite herself in triage. Patient coming to ER complaining of anxiety, depression. Denies SI/HI at this time. Denies auditory hallucinations, visual hallucinations. Noted to be crying and attempting to bite herself in triage. Hx of schizophrenia and compliant with mediations

## 2023-02-20 NOTE — ED BEHAVIORAL HEALTH ASSESSMENT NOTE - RISK ASSESSMENT
Pt is at chronically moderate risk of suicide though at acutely low risk of suicide. Static risk factors include hx of prior SA, hx of inpatient psychiatric admission, hx of trauma, hx of substance use, multiple changes in outpatient providers. Dynamic risk factors include current heroin use, depressed mood, recent loss. Protective factors include no current SI, help-seeking, goal-oriented. At this time, pt's risk factors do not require inpatient psychiatric admission for further safety and stabilization and can be further mitigated in an outpatient level of care.

## 2023-02-20 NOTE — ED BEHAVIORAL HEALTH ASSESSMENT NOTE - HPI (INCLUDE ILLNESS QUALITY, SEVERITY, DURATION, TIMING, CONTEXT, MODIFYING FACTORS, ASSOCIATED SIGNS AND SYMPTOMS)
The patient is a 32 year old woman, single, domiciled with sister, PPH of reported schizophrenia, PTSD, substance use disorder (heroin, benzodiazepines, alcohol), 1 prior psychiatric admission at [facility] 1 year ago for suicidality, 1 hx of SA in her 20s via cutting, hx of trauma, denies legal hx, presents BIB self after reporting worsening anxiety and depression.     Pt notes that her younger cousin  unexpectedly a couple weeks ago which has been hard for her. She notes struggling with worsening "episodes" which she describes as feeling increasingly anxious and angry, frequent biting of herself. She notes that she has been living with her sister who said she had to come into the hospital as she has not been able to handle these episodes. Pt reports that she recently starting using heroin again (states she uses a few bags a day) after being clean for a few months, mainly driven by the grief at her cousin's death. Pt denies any SI, saying that her father living in [place] is a protective factor for her. She states her sleep and appetite have been poor. She notes having had visual hallucinations of her cousin though otherwise denies any AVH.     She states that she has been in a program ([facility]) in [place] to help with her substance use. She has a therapist that she sees the ([facility]) and has been seen by a new psychiatric, Dr. Cecilio Frankel there though she does not know when her next f/u appointment is.    She notes using alcohol socially, drinking a mixed drink  approximately once a week. She states that she has been prescribed Xanax TID though doesn't always need to take all three in the day. She denies any suicidality or homicidality at this time.     Attempted to obtain collateral from pt's sister at [phone] though was informed that this was a wrong number. No other numbers listed or known. The patient is a 32 year old woman, single, domiciled with sister, PPH of reported schizophrenia, PTSD, substance use disorder (heroin, benzodiazepines, alcohol), 1 prior psychiatric admission at [facility] 1 year ago for suicidality, 1 hx of SA in her 20s via cutting, hx of trauma, denies legal hx, unknown family hx (pt reports being adopted), presents BIB self after reporting worsening anxiety and depression.     Pt notes that her younger cousin  unexpectedly a couple weeks ago which has been hard for her. She notes struggling with worsening "episodes" which she describes as feeling increasingly anxious and angry, frequent biting of herself. She notes that she has been living with her sister who said she had to come into the hospital as she has not been able to handle these episodes. Pt reports that she recently starting using heroin again (states she uses a few bags a day) after being clean for a few months, mainly driven by the grief at her cousin's death. Pt denies any SI, saying that her father living in [place] is a protective factor for her. She states her sleep and appetite have been poor. She notes having had visual hallucinations of her cousin though otherwise denies any AVH.     She states that she has been in a program ([facility]) in [place] to help with her substance use. She has a therapist that she sees the ([facility]) and has been seen by a new psychiatric, Dr. Cecilio Frankel there though she does not know when her next f/u appointment is.    She notes using alcohol socially, drinking a mixed drink  approximately once a week. She states that she has been prescribed Xanax TID though doesn't always need to take all three in the day. She denies any suicidality or homicidality at this time.     Attempted to obtain collateral from pt's sister at [phone] though was informed that this was a wrong number. No other numbers listed or known. The patient is a 32 year old woman, single, domiciled with sister, PPH of reported schizophrenia, PTSD, ADHD, substance use disorder (heroin, benzodiazepines, alcohol), 1 prior psychiatric admission at [facility] 1 year ago for suicidality, 1 hx of SA in her 20s via cutting, hx of trauma, denies legal hx, unknown family hx (pt reports being adopted), presents BIB self after reporting worsening anxiety and depression.     Pt notes that her younger cousin  unexpectedly a couple weeks ago which has been hard for her. She notes struggling with worsening "episodes" which she describes as feeling increasingly anxious and angry, frequent biting of herself. She notes that she has been living with her sister who said she had to come into the hospital as she has not been able to handle these episodes. Pt reports that she recently starting using heroin again (states she uses a few bags a day) after being clean for a few months, mainly driven by the grief 2/2 her cousin's death. Pt denies any SI, saying that her father living in [place] is a protective factor for her. She states her sleep and appetite have been poor. She notes having had visual hallucinations of her cousin though otherwise denies any AVH.     She states that she has been in a program ([facility]) in [place] to help with her substance use. She has a therapist that she sees  ([facility]) and has been seen by a new psychiatric, Dr. Cecilio Frankel there though she does not know when her next f/u appointment is.    She notes using alcohol socially, drinking a mixed drink  approximately once a week. She states that she has been prescribed Xanax TID though doesn't always need to take all three in the day. She denies any suicidality or homicidality at this time.     Attempted to obtain collateral from pt's sister at [phone] though was informed that this was a wrong number. No other numbers listed or known.

## 2023-02-20 NOTE — ED BEHAVIORAL HEALTH ASSESSMENT NOTE - OTHER PAST PSYCHIATRIC HISTORY (INCLUDE DETAILS REGARDING ONSET, COURSE OF ILLNESS, INPATIENT/OUTPATIENT TREATMENT)
pt notes 1 prior psychiatric admission at [facility]  States diagnosed with schizophrenia (hx of paranoia), PTSD, substance use

## 2023-02-21 PROBLEM — F10.10 ALCOHOL ABUSE, UNCOMPLICATED: Chronic | Status: ACTIVE | Noted: 2021-06-15

## 2023-02-21 PROBLEM — F32.9 MAJOR DEPRESSIVE DISORDER, SINGLE EPISODE, UNSPECIFIED: Chronic | Status: ACTIVE | Noted: 2021-06-15

## 2023-02-21 PROBLEM — F31.31 BIPOLAR DISORDER, CURRENT EPISODE DEPRESSED, MILD: Chronic | Status: ACTIVE | Noted: 2021-06-15

## 2023-02-21 PROBLEM — F11.10 OPIOID ABUSE, UNCOMPLICATED: Chronic | Status: ACTIVE | Noted: 2021-06-15

## 2023-02-21 LAB
COVID-19 SPIKE DOMAIN AB INTERP: POSITIVE
COVID-19 SPIKE DOMAIN ANTIBODY RESULT: >250 U/ML — HIGH
SARS-COV-2 IGG+IGM SERPL QL IA: >250 U/ML — HIGH
SARS-COV-2 IGG+IGM SERPL QL IA: POSITIVE

## 2023-04-06 ENCOUNTER — HOSPITAL ENCOUNTER (INPATIENT)
Dept: HOSPITAL 74 - YASAS | Age: 33
LOS: 3 days | Discharge: LEFT BEFORE DISCHARGE | DRG: 770 | End: 2023-04-09
Attending: SURGERY | Admitting: ALLERGY & IMMUNOLOGY
Payer: COMMERCIAL

## 2023-04-06 VITALS — BODY MASS INDEX: 30 KG/M2

## 2023-04-06 DIAGNOSIS — F41.9: ICD-10-CM

## 2023-04-06 DIAGNOSIS — Z86.19: ICD-10-CM

## 2023-04-06 DIAGNOSIS — F11.23: Primary | ICD-10-CM

## 2023-04-06 DIAGNOSIS — F20.9: ICD-10-CM

## 2023-04-06 DIAGNOSIS — F17.210: ICD-10-CM

## 2023-04-06 DIAGNOSIS — F10.10: ICD-10-CM

## 2023-04-06 DIAGNOSIS — F31.9: ICD-10-CM

## 2023-04-06 PROCEDURE — U0003 INFECTIOUS AGENT DETECTION BY NUCLEIC ACID (DNA OR RNA); SEVERE ACUTE RESPIRATORY SYNDROME CORONAVIRUS 2 (SARS-COV-2) (CORONAVIRUS DISEASE [COVID-19]), AMPLIFIED PROBE TECHNIQUE, MAKING USE OF HIGH THROUGHPUT TECHNOLOGIES AS DESCRIBED BY CMS-2020-01-R: HCPCS

## 2023-04-06 PROCEDURE — U0005 INFEC AGEN DETEC AMPLI PROBE: HCPCS

## 2023-04-06 RX ADMIN — Medication SCH MG: at 23:15

## 2023-04-07 LAB
ALBUMIN SERPL-MCNC: 3.6 G/DL (ref 3.4–5)
ALP SERPL-CCNC: 41 U/L (ref 45–117)
ALT SERPL-CCNC: 26 U/L (ref 13–61)
ANION GAP SERPL CALC-SCNC: 3 MMOL/L (ref 8–16)
AST SERPL-CCNC: 17 U/L (ref 15–37)
BILIRUB SERPL-MCNC: 0.4 MG/DL (ref 0.2–1)
BUN SERPL-MCNC: 16.4 MG/DL (ref 7–18)
CALCIUM SERPL-MCNC: 9.2 MG/DL (ref 8.5–10.1)
CHLORIDE SERPL-SCNC: 104 MMOL/L (ref 98–107)
CO2 SERPL-SCNC: 31 MMOL/L (ref 21–32)
CREAT SERPL-MCNC: 0.7 MG/DL (ref 0.55–1.3)
DEPRECATED RDW RBC AUTO: 13.3 % (ref 11.6–15.6)
GLUCOSE SERPL-MCNC: 85 MG/DL (ref 74–106)
HCT VFR BLD CALC: 36.2 % (ref 32.4–45.2)
HGB BLD-MCNC: 12.4 GM/DL (ref 10.7–15.3)
MCH RBC QN AUTO: 30.2 PG (ref 25.7–33.7)
MCHC RBC AUTO-ENTMCNC: 34.2 G/DL (ref 32–36)
MCV RBC: 88.2 FL (ref 80–96)
PLATELET # BLD AUTO: 171 10^3/UL (ref 134–434)
PMV BLD: 8.3 FL (ref 7.5–11.1)
POTASSIUM SERPLBLD-SCNC: 4.5 MMOL/L (ref 3.5–5.1)
PROT SERPL-MCNC: 6.5 G/DL (ref 6.4–8.2)
RBC # BLD AUTO: 4.1 M/MM3 (ref 3.6–5.2)
SODIUM SERPL-SCNC: 138 MMOL/L (ref 136–145)
WBC # BLD AUTO: 6.2 K/MM3 (ref 4–10)

## 2023-04-07 PROCEDURE — HZ2ZZZZ DETOXIFICATION SERVICES FOR SUBSTANCE ABUSE TREATMENT: ICD-10-PCS | Performed by: SURGERY

## 2023-04-07 RX ADMIN — HYDROXYZINE PAMOATE PRN MG: 25 CAPSULE ORAL at 17:43

## 2023-04-07 RX ADMIN — Medication SCH TAB: at 10:55

## 2023-04-07 RX ADMIN — Medication PRN MG: at 22:03

## 2023-04-07 RX ADMIN — Medication SCH MG: at 22:03

## 2023-04-07 RX ADMIN — METHOCARBAMOL PRN MG: 500 TABLET ORAL at 17:43

## 2023-04-08 RX ADMIN — METHOCARBAMOL PRN MG: 500 TABLET ORAL at 22:34

## 2023-04-08 RX ADMIN — Medication PRN EACH: at 22:32

## 2023-04-08 RX ADMIN — Medication SCH TAB: at 10:27

## 2023-04-08 RX ADMIN — Medication PRN MG: at 22:32

## 2023-04-08 RX ADMIN — Medication SCH MG: at 22:32

## 2023-04-08 RX ADMIN — Medication PRN EACH: at 10:28

## 2023-04-08 RX ADMIN — HYDROXYZINE PAMOATE PRN MG: 25 CAPSULE ORAL at 18:05

## 2023-04-08 RX ADMIN — Medication PRN EACH: at 13:37

## 2023-04-09 VITALS
RESPIRATION RATE: 18 BRPM | TEMPERATURE: 98.1 F | SYSTOLIC BLOOD PRESSURE: 131 MMHG | HEART RATE: 73 BPM | DIASTOLIC BLOOD PRESSURE: 75 MMHG

## 2023-04-09 RX ADMIN — METHOCARBAMOL PRN MG: 500 TABLET ORAL at 10:05

## 2023-04-09 RX ADMIN — Medication SCH TAB: at 10:08

## 2023-04-09 RX ADMIN — Medication PRN EACH: at 10:07

## 2023-06-18 ENCOUNTER — INPATIENT (INPATIENT)
Facility: HOSPITAL | Age: 33
LOS: 4 days | Discharge: HOME | DRG: 882 | End: 2023-06-23
Attending: PSYCHIATRY & NEUROLOGY | Admitting: PSYCHIATRY & NEUROLOGY
Payer: COMMERCIAL

## 2023-06-18 VITALS
RESPIRATION RATE: 18 BRPM | WEIGHT: 139.99 LBS | OXYGEN SATURATION: 95 % | TEMPERATURE: 98 F | DIASTOLIC BLOOD PRESSURE: 60 MMHG | HEART RATE: 88 BPM | SYSTOLIC BLOOD PRESSURE: 99 MMHG | HEIGHT: 60 IN

## 2023-06-18 DIAGNOSIS — F25.9 SCHIZOAFFECTIVE DISORDER, UNSPECIFIED: ICD-10-CM

## 2023-06-18 LAB
ALBUMIN SERPL ELPH-MCNC: 4.4 G/DL — SIGNIFICANT CHANGE UP (ref 3.3–5)
ALP SERPL-CCNC: 58 U/L — SIGNIFICANT CHANGE UP (ref 40–120)
ALT FLD-CCNC: 19 U/L — SIGNIFICANT CHANGE UP (ref 10–45)
AMPHET UR-MCNC: NEGATIVE — SIGNIFICANT CHANGE UP
ANION GAP SERPL CALC-SCNC: 10 MMOL/L — SIGNIFICANT CHANGE UP (ref 5–17)
APAP SERPL-MCNC: <5 UG/ML — LOW (ref 10–30)
APPEARANCE UR: CLEAR — SIGNIFICANT CHANGE UP
AST SERPL-CCNC: 16 U/L — SIGNIFICANT CHANGE UP (ref 10–40)
BARBITURATES UR SCN-MCNC: NEGATIVE — SIGNIFICANT CHANGE UP
BASOPHILS # BLD AUTO: 0.02 K/UL — SIGNIFICANT CHANGE UP (ref 0–0.2)
BASOPHILS NFR BLD AUTO: 0.3 % — SIGNIFICANT CHANGE UP (ref 0–2)
BENZODIAZ UR-MCNC: POSITIVE
BILIRUB DIRECT SERPL-MCNC: 0.2 MG/DL — SIGNIFICANT CHANGE UP (ref 0–0.3)
BILIRUB INDIRECT FLD-MCNC: 0.2 MG/DL — SIGNIFICANT CHANGE UP (ref 0.2–1)
BILIRUB SERPL-MCNC: 0.4 MG/DL — SIGNIFICANT CHANGE UP (ref 0.2–1.2)
BILIRUB UR-MCNC: NEGATIVE — SIGNIFICANT CHANGE UP
BUN SERPL-MCNC: 15 MG/DL — SIGNIFICANT CHANGE UP (ref 7–23)
CALCIUM SERPL-MCNC: 9.6 MG/DL — SIGNIFICANT CHANGE UP (ref 8.4–10.5)
CHLORIDE SERPL-SCNC: 101 MMOL/L — SIGNIFICANT CHANGE UP (ref 96–108)
CO2 SERPL-SCNC: 25 MMOL/L — SIGNIFICANT CHANGE UP (ref 22–31)
COCAINE METAB.OTHER UR-MCNC: NEGATIVE — SIGNIFICANT CHANGE UP
COLOR SPEC: YELLOW — SIGNIFICANT CHANGE UP
CREAT SERPL-MCNC: 0.8 MG/DL — SIGNIFICANT CHANGE UP (ref 0.5–1.3)
DIFF PNL FLD: NEGATIVE — SIGNIFICANT CHANGE UP
EGFR: 100 ML/MIN/1.73M2 — SIGNIFICANT CHANGE UP
EOSINOPHIL # BLD AUTO: 0.13 K/UL — SIGNIFICANT CHANGE UP (ref 0–0.5)
EOSINOPHIL NFR BLD AUTO: 1.7 % — SIGNIFICANT CHANGE UP (ref 0–6)
ETHANOL SERPL-MCNC: <10 MG/DL — SIGNIFICANT CHANGE UP (ref 0–10)
GLUCOSE SERPL-MCNC: 100 MG/DL — HIGH (ref 70–99)
GLUCOSE UR QL: NEGATIVE — SIGNIFICANT CHANGE UP
HCG SERPL-ACNC: <0 MIU/ML — SIGNIFICANT CHANGE UP
HCT VFR BLD CALC: 38.6 % — SIGNIFICANT CHANGE UP (ref 34.5–45)
HGB BLD-MCNC: 12.8 G/DL — SIGNIFICANT CHANGE UP (ref 11.5–15.5)
IMM GRANULOCYTES NFR BLD AUTO: 0.4 % — SIGNIFICANT CHANGE UP (ref 0–0.9)
KETONES UR-MCNC: NEGATIVE — SIGNIFICANT CHANGE UP
LEUKOCYTE ESTERASE UR-ACNC: NEGATIVE — SIGNIFICANT CHANGE UP
LYMPHOCYTES # BLD AUTO: 2.03 K/UL — SIGNIFICANT CHANGE UP (ref 1–3.3)
LYMPHOCYTES # BLD AUTO: 26.6 % — SIGNIFICANT CHANGE UP (ref 13–44)
MCHC RBC-ENTMCNC: 29.4 PG — SIGNIFICANT CHANGE UP (ref 27–34)
MCHC RBC-ENTMCNC: 33.2 GM/DL — SIGNIFICANT CHANGE UP (ref 32–36)
MCV RBC AUTO: 88.7 FL — SIGNIFICANT CHANGE UP (ref 80–100)
METHADONE UR-MCNC: NEGATIVE — SIGNIFICANT CHANGE UP
MONOCYTES # BLD AUTO: 0.52 K/UL — SIGNIFICANT CHANGE UP (ref 0–0.9)
MONOCYTES NFR BLD AUTO: 6.8 % — SIGNIFICANT CHANGE UP (ref 2–14)
NEUTROPHILS # BLD AUTO: 4.91 K/UL — SIGNIFICANT CHANGE UP (ref 1.8–7.4)
NEUTROPHILS NFR BLD AUTO: 64.2 % — SIGNIFICANT CHANGE UP (ref 43–77)
NITRITE UR-MCNC: NEGATIVE — SIGNIFICANT CHANGE UP
NRBC # BLD: 0 /100 WBCS — SIGNIFICANT CHANGE UP (ref 0–0)
OPIATES UR-MCNC: POSITIVE
PCP SPEC-MCNC: SIGNIFICANT CHANGE UP
PCP UR-MCNC: NEGATIVE — SIGNIFICANT CHANGE UP
PH UR: 8 — SIGNIFICANT CHANGE UP (ref 5–8)
PLATELET # BLD AUTO: 219 K/UL — SIGNIFICANT CHANGE UP (ref 150–400)
POTASSIUM SERPL-MCNC: 4.6 MMOL/L — SIGNIFICANT CHANGE UP (ref 3.5–5.3)
POTASSIUM SERPL-SCNC: 4.6 MMOL/L — SIGNIFICANT CHANGE UP (ref 3.5–5.3)
PROT SERPL-MCNC: 7.2 G/DL — SIGNIFICANT CHANGE UP (ref 6–8.3)
PROT UR-MCNC: NEGATIVE MG/DL — SIGNIFICANT CHANGE UP
RBC # BLD: 4.35 M/UL — SIGNIFICANT CHANGE UP (ref 3.8–5.2)
RBC # FLD: 12.1 % — SIGNIFICANT CHANGE UP (ref 10.3–14.5)
SALICYLATES SERPL-MCNC: 1.7 MG/DL — LOW (ref 2.8–20)
SODIUM SERPL-SCNC: 136 MMOL/L — SIGNIFICANT CHANGE UP (ref 135–145)
SP GR SPEC: 1.01 — SIGNIFICANT CHANGE UP (ref 1–1.03)
THC UR QL: NEGATIVE — SIGNIFICANT CHANGE UP
UROBILINOGEN FLD QL: 0.2 E.U./DL — SIGNIFICANT CHANGE UP
WBC # BLD: 7.64 K/UL — SIGNIFICANT CHANGE UP (ref 3.8–10.5)
WBC # FLD AUTO: 7.64 K/UL — SIGNIFICANT CHANGE UP (ref 3.8–10.5)

## 2023-06-18 PROCEDURE — 99285 EMERGENCY DEPT VISIT HI MDM: CPT

## 2023-06-18 RX ORDER — HALOPERIDOL DECANOATE 100 MG/ML
5 INJECTION INTRAMUSCULAR EVERY 4 HOURS
Refills: 0 | Status: DISCONTINUED | OUTPATIENT
Start: 2023-06-18 | End: 2023-06-21

## 2023-06-18 RX ORDER — TRAZODONE HCL 50 MG
50 TABLET ORAL AT BEDTIME
Refills: 0 | Status: DISCONTINUED | OUTPATIENT
Start: 2023-06-18 | End: 2023-06-23

## 2023-06-18 RX ORDER — NICOTINE POLACRILEX 2 MG
2 GUM BUCCAL
Refills: 0 | Status: DISCONTINUED | OUTPATIENT
Start: 2023-06-18 | End: 2023-06-23

## 2023-06-18 RX ORDER — ALPRAZOLAM 0.25 MG
1 TABLET ORAL
Refills: 0 | Status: DISCONTINUED | OUTPATIENT
Start: 2023-06-18 | End: 2023-06-23

## 2023-06-18 RX ORDER — ACETAMINOPHEN 500 MG
650 TABLET ORAL EVERY 6 HOURS
Refills: 0 | Status: DISCONTINUED | OUTPATIENT
Start: 2023-06-18 | End: 2023-06-20

## 2023-06-18 RX ORDER — QUETIAPINE FUMARATE 200 MG/1
100 TABLET, FILM COATED ORAL AT BEDTIME
Refills: 0 | Status: DISCONTINUED | OUTPATIENT
Start: 2023-06-18 | End: 2023-06-23

## 2023-06-18 RX ORDER — HALOPERIDOL DECANOATE 100 MG/ML
10 INJECTION INTRAMUSCULAR DAILY
Refills: 0 | Status: DISCONTINUED | OUTPATIENT
Start: 2023-06-18 | End: 2023-06-21

## 2023-06-18 RX ADMIN — QUETIAPINE FUMARATE 100 MILLIGRAM(S): 200 TABLET, FILM COATED ORAL at 22:54

## 2023-06-18 RX ADMIN — Medication 650 MILLIGRAM(S): at 22:54

## 2023-06-18 RX ADMIN — Medication 1 MILLIGRAM(S): at 23:27

## 2023-06-18 NOTE — BH PATIENT PROFILE - FALL HARM RISK - UNIVERSAL INTERVENTIONS
Bed in lowest position, wheels locked, appropriate side rails in place/Call bell, personal items and telephone in reach/Instruct patient to call for assistance before getting out of bed or chair/Non-slip footwear when patient is out of bed/[place] to call system/Physically safe environment - no spills, clutter or unnecessary equipment/Purposeful Proactive Rounding/Room/bathroom lighting operational, light cord in reach Bed in lowest position, wheels locked, appropriate side rails in place/Call bell, personal items and telephone in reach/Instruct patient to call for assistance before getting out of bed or chair/Non-slip footwear when patient is out of bed/[place] to call system/Physically safe environment - no spills, clutter or unnecessary equipment/Purposeful Proactive Rounding/Room/bathroom lighting operational, light cord in reach Bed in lowest position, wheels locked, appropriate side rails in place/Call bell, personal items and telephone in reach/Instruct patient to call for assistance before getting out of bed or chair/Non-slip footwear when patient is out of bed/[place] to call system/Physically safe environment - no spills, clutter or unnecessary equipment/Purposeful Proactive Rounding/Room/bathroom lighting operational, light cord in reach
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
## 2023-06-18 NOTE — ED PROVIDER NOTE - CLINICAL SUMMARY MEDICAL DECISION MAKING FREE TEXT BOX
pt not acute danger to self or others however having poor time functioning at home due to decompensated psych disorder  plan for psych evaluation
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
## 2023-06-18 NOTE — ED ADULT NURSE NOTE - NSFALLUNIVINTERV_ED_ALL_ED
Bed/Stretcher in lowest position, wheels locked, appropriate side rails in place/Call bell, personal items and telephone in reach/Instruct patient to call for assistance before getting out of bed/chair/stretcher/Non-slip footwear applied when patient is off stretcher/[place] to call system/Physically safe environment - no spills, clutter or unnecessary equipment/Purposeful proactive rounding/Room/bathroom lighting operational, light cord in reach Bed/Stretcher in lowest position, wheels locked, appropriate side rails in place/Call bell, personal items and telephone in reach/Instruct patient to call for assistance before getting out of bed/chair/stretcher/Non-slip footwear applied when patient is off stretcher/[place] to call system/Physically safe environment - no spills, clutter or unnecessary equipment/Purposeful proactive rounding/Room/bathroom lighting operational, light cord in reach Bed/Stretcher in lowest position, wheels locked, appropriate side rails in place/Call bell, personal items and telephone in reach/Instruct patient to call for assistance before getting out of bed/chair/stretcher/Non-slip footwear applied when patient is off stretcher/[place] to call system/Physically safe environment - no spills, clutter or unnecessary equipment/Purposeful proactive rounding/Room/bathroom lighting operational, light cord in reach

## 2023-06-18 NOTE — ED BEHAVIORAL HEALTH ASSESSMENT NOTE - NS ED BHA PLAN HOLD IN ED BH CONTACTED FT
Sees psychiatrist at "[facility]" Sees psychiatrist at "[facility]" Sees psychiatrist at "[facility]"

## 2023-06-18 NOTE — ED ADULT NURSE REASSESSMENT NOTE - NS ED NURSE REASSESS COMMENT FT1
Pt requesting food, food given.
Received report from KVNG Laird. Received patient in stretcher. AOX4. Vital signs as noted in flowsheet.  Patient denies chest pain, pain, discomfort, shortness of breath, difficulty breathing and any form of distress not noted. Patient oriented to ED area. Plan of care discussed and verbalized understanding. All needs attended. Purposeful proactive hourly rounding in progress. Constant observation in progress.

## 2023-06-18 NOTE — ED BEHAVIORAL HEALTH ASSESSMENT NOTE - OTHER PAST PSYCHIATRIC HISTORY (INCLUDE DETAILS REGARDING ONSET, COURSE OF ILLNESS, INPATIENT/OUTPATIENT TREATMENT)
Patient has had 3 psychiatric admits, most recently 1 year ago related to similar episode of flashback or vision related to trauma

## 2023-06-18 NOTE — ED BEHAVIORAL HEALTH ASSESSMENT NOTE - NS ED BHA PLAN HOLD IN ED HANDOFF TO ED TEAM YN
Occupational [id number] [email or web address]  Occupational Therapy Not Seen Note    DATE: 2022    NAME: Roberto Salinas  MRN: [id number]   : 1959      Patient not seen this date for Occupational Therapy due to: HD    Next Scheduled Treatment: 22    Electronically signed by DAYANA Patel on 2022 at 11:48 AM Yes

## 2023-06-18 NOTE — ED BEHAVIORAL HEALTH NOTE - BEHAVIORAL HEALTH NOTE
Patient agreed to voluntary admission after episode of self harm. Voluntaries signed with assistance of . Admit to [facility].

## 2023-06-18 NOTE — ED ADULT NURSE NOTE - OBJECTIVE STATEMENT
Pt alert, oriented, and ambulatory. Pt denies SI/HI. Pt reports having anxiety about past events but refuses to elaborate. Pt denies any pain or discomfort. Pt denies SOB/CP. Pt anxious but cooperative and compliant on assessment.
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
## 2023-06-18 NOTE — ED ADULT NURSE NOTE - ED STAT RN HANDOFF DETAILS
Patient stable for transfer. Transfer and plan of care discussed with patient and verbalized understanding. No acute distress noted. Safety precautions maintained.

## 2023-06-18 NOTE — ED BEHAVIORAL HEALTH ASSESSMENT NOTE - NSBHATTESTBILLING_PSY_A_CORE
[id number] diagnostic evaluation with medical services [id number]-Ydgglbtmovz diagnostic evaluation with medical services [id number]-Tkyeaujkzkj diagnostic evaluation with medical services
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
## 2023-06-18 NOTE — ED PROVIDER NOTE - PHYSICAL EXAMINATION
CONST: nontoxic NAD speaking in full sentences tearful  HEAD: atraumatic  EYES: conjunctivae clear  NECK: supple  CARD: regular rate  CHEST: breathing comfortably, no stridor/retractions/tripoding  EXT: FROM  SKIN: warm, dry  NEURO: awake alert answering questions following commands moving all extremities

## 2023-06-18 NOTE — ED PROVIDER NOTE - CARE PLAN
Principal Discharge DX:	Post traumatic stress disorder (PTSD)  Secondary Diagnosis:	Acute exacerbation of chronic schizoaffective schizophrenia   1

## 2023-06-19 LAB
A1C WITH ESTIMATED AVERAGE GLUCOSE RESULT: 5.4 % — SIGNIFICANT CHANGE UP (ref 4–5.6)
ESTIMATED AVERAGE GLUCOSE: 108 MG/DL — SIGNIFICANT CHANGE UP (ref 68–114)
HCG SERPL-ACNC: <0 MIU/ML — SIGNIFICANT CHANGE UP

## 2023-06-19 PROCEDURE — 99223 1ST HOSP IP/OBS HIGH 75: CPT

## 2023-06-19 RX ORDER — METHADONE HYDROCHLORIDE 40 MG/1
30 TABLET ORAL DAILY
Refills: 0 | Status: DISCONTINUED | OUTPATIENT
Start: 2023-06-20 | End: 2023-06-23

## 2023-06-19 RX ORDER — METHADONE HYDROCHLORIDE 40 MG/1
30 TABLET ORAL ONCE
Refills: 0 | Status: DISCONTINUED | OUTPATIENT
Start: 2023-06-19 | End: 2023-06-19

## 2023-06-19 RX ADMIN — QUETIAPINE FUMARATE 100 MILLIGRAM(S): 200 TABLET, FILM COATED ORAL at 22:03

## 2023-06-19 RX ADMIN — Medication 1 MILLIGRAM(S): at 22:04

## 2023-06-19 RX ADMIN — HALOPERIDOL DECANOATE 10 MILLIGRAM(S): 100 INJECTION INTRAMUSCULAR at 10:29

## 2023-06-19 RX ADMIN — METHADONE HYDROCHLORIDE 30 MILLIGRAM(S): 40 TABLET ORAL at 16:44

## 2023-06-19 RX ADMIN — Medication 1 MILLIGRAM(S): at 11:48
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
## 2023-06-19 NOTE — BH INPATIENT PSYCHIATRY ASSESSMENT NOTE - NSBHCHARTREVIEWVS_PSY_A_CORE FT
Vital Signs Last 24 Hrs  T(C): 36.6 (06-19-23 @ 09:01), Max: 36.9 (06-18-23 @ 17:27)  T(F): 97.9 (06-19-23 @ 09:01), Max: 98.5 (06-18-23 @ 17:27)  HR: 53 (06-19-23 @ 09:01) (53 - 88)  BP: 108/75 (06-19-23 @ 09:01) (99/60 - 110/72)  BP(mean): --  RR: 16 (06-19-23 @ 09:01) (16 - 18)  SpO2: 99% (06-19-23 @ 09:01) (95% - 99%)     Vital Signs Last 24 Hrs  T(C): 37 (06-21-23 @ 08:29), Max: 37.4 (06-20-23 @ 13:30)  T(F): 98.6 (06-21-23 @ 08:29), Max: 99.3 (06-20-23 @ 13:30)  HR: 79 (06-21-23 @ 08:29) (71 - 118)  BP: 128/85 (06-21-23 @ 08:29) (128/85 - 143/85)  BP(mean): --  RR: 18 (06-21-23 @ 08:29) (16 - 18)  SpO2: 96% (06-21-23 @ 08:29) (96% - 100%)     Vital Signs Last 24 Hrs  T(C): --  T(F): --  HR: --  BP: --  BP(mean): --  RR: --  SpO2: --

## 2023-06-19 NOTE — BH INPATIENT PSYCHIATRY ASSESSMENT NOTE - NSTXIMPULSINTERMD_PSY_ALL_CORE
Recommend mood stabilizing medications such as continuing Haldol 10 mg along with Quetiapine 100 mg nightly to help improve sleep.   Continue using Alprazolam 1 mg twice a day.

## 2023-06-19 NOTE — BH INPATIENT PSYCHIATRY ASSESSMENT NOTE - CURRENT MEDICATION
MEDICATIONS  (STANDING):  acetaminophen     Tablet .. 650 milliGRAM(s) Oral every 6 hours  ALPRAZolam 1 milliGRAM(s) Oral two times a day  haloperidol     Tablet 10 milliGRAM(s) Oral daily  QUEtiapine 100 milliGRAM(s) Oral at bedtime    MEDICATIONS  (PRN):  haloperidol     Tablet 5 milliGRAM(s) Oral every 4 hours PRN agitation  LORazepam     Tablet 0.5 milliGRAM(s) Oral three times a day PRN anxiety  nicotine  Polacrilex Gum 2 milliGRAM(s) Oral every 3 hours PRN nicotine cessation  traZODone 50 milliGRAM(s) Oral at bedtime PRN insomnia   MEDICATIONS  (STANDING):  ALPRAZolam 1 milliGRAM(s) Oral two times a day  cloNIDine 0.1 milliGRAM(s) Oral every 6 hours  haloperidol     Tablet 10 milliGRAM(s) Oral daily  methadone    Tablet 30 milliGRAM(s) Oral daily  QUEtiapine 100 milliGRAM(s) Oral at bedtime    MEDICATIONS  (PRN):  acetaminophen     Tablet .. 650 milliGRAM(s) Oral every 6 hours PRN Temp greater or equal to 38C (100.4F), Mild Pain (1 - 3), Moderate Pain (4 - 6), Severe Pain (7 - 10)  aluminum hydroxide/magnesium hydroxide/simethicone Suspension 30 milliLiter(s) Oral every 4 hours PRN Dyspepsia  diphenhydrAMINE 50 milliGRAM(s) Oral every 6 hours PRN anxiety/stiffness  haloperidol     Tablet 5 milliGRAM(s) Oral every 4 hours PRN agitation  loperamide 2 milliGRAM(s) Oral two times a day PRN diarrhea  LORazepam     Tablet 0.5 milliGRAM(s) Oral three times a day PRN anxiety  nicotine  Polacrilex Gum 2 milliGRAM(s) Oral every 3 hours PRN nicotine cessation  ondansetron    Tablet 4 milliGRAM(s) Oral every 8 hours PRN Vomitting  traZODone 50 milliGRAM(s) Oral at bedtime PRN insomnia   MEDICATIONS  (STANDING):    MEDICATIONS  (PRN):
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
## 2023-06-19 NOTE — BH INPATIENT PSYCHIATRY ASSESSMENT NOTE - NSTXPOTSDINTERMD_PSY_ALL_CORE
Recommend mood stabilizing medications such as continuing Haldol 10 mg along with Quetiapine 100 mg nightly to help improve sleep.

## 2023-06-19 NOTE — BH INPATIENT PSYCHIATRY ASSESSMENT NOTE - NSBHMETABOLIC_PSY_ALL_CORE_FT
BMI: BMI (kg/m2): 24.6 (06-18-23 @ 22:10)  HbA1c: A1C with Estimated Average Glucose Result: 5.4 % (06-19-23 @ 10:11)    Glucose:   BP: 108/75 (06-19-23 @ 09:01) (99/60 - 110/72)  Lipid Panel:  BMI: BMI (kg/m2): 24.6 (06-18-23 @ 22:10)  HbA1c: A1C with Estimated Average Glucose Result: 5.4 % (06-20-23 @ 05:30)    Glucose:   BP: 128/85 (06-21-23 @ 08:29) (99/60 - 143/85)  Lipid Panel: Date/Time: 06-20-23 @ 05:30  Cholesterol, Serum: 211  Direct LDL: --  HDL Cholesterol, Serum: 43  Total Cholesterol/HDL Ration Measurement: --  Triglycerides, Serum: 88   BMI: BMI (kg/m2): 24.6 (06-18-23 @ 22:10)  HbA1c: A1C with Estimated Average Glucose Result: 5.4 % (06-20-23 @ 05:30)    Glucose:   BP: 116/79 (06-23-23 @ 06:10) (104/71 - 126/85)  Lipid Panel: Date/Time: 06-20-23 @ 05:30  Cholesterol, Serum: 211  Direct LDL: --  HDL Cholesterol, Serum: 43  Total Cholesterol/HDL Ration Measurement: --  Triglycerides, Serum: 88
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
## 2023-06-19 NOTE — BH INPATIENT PSYCHIATRY ASSESSMENT NOTE - NSTXSUBMISINTERMD_PSY_ALL_CORE
Start patient on Methadone 30 mg with plan to taper or continue while monitoring response.  Start Clonidine 0.1 mg every 6 hrs and Zofran as needed for symptomatic management.  Encourage patient accept standing opioid replacement therapy and outpatient substance use program.

## 2023-06-20 DIAGNOSIS — F11.20 OPIOID DEPENDENCE, UNCOMPLICATED: ICD-10-CM

## 2023-06-20 LAB
A1C WITH ESTIMATED AVERAGE GLUCOSE RESULT: 5.4 % — SIGNIFICANT CHANGE UP (ref 4–5.6)
CHOLEST SERPL-MCNC: 211 MG/DL — HIGH
ESTIMATED AVERAGE GLUCOSE: 108 MG/DL — SIGNIFICANT CHANGE UP (ref 68–114)
HDLC SERPL-MCNC: 43 MG/DL — LOW
LIPID PNL WITH DIRECT LDL SERPL: 150 MG/DL — HIGH
NON HDL CHOLESTEROL: 168 MG/DL — HIGH
TRIGL SERPL-MCNC: 88 MG/DL — SIGNIFICANT CHANGE UP

## 2023-06-20 PROCEDURE — 76700 US EXAM ABDOM COMPLETE: CPT | Mod: 26

## 2023-06-20 PROCEDURE — 99233 SBSQ HOSP IP/OBS HIGH 50: CPT

## 2023-06-20 RX ORDER — DIPHENHYDRAMINE HCL 50 MG
50 CAPSULE ORAL ONCE
Refills: 0 | Status: COMPLETED | OUTPATIENT
Start: 2023-06-20 | End: 2023-06-20

## 2023-06-20 RX ORDER — ONDANSETRON 8 MG/1
4 TABLET, FILM COATED ORAL ONCE
Refills: 0 | Status: COMPLETED | OUTPATIENT
Start: 2023-06-20 | End: 2023-06-20

## 2023-06-20 RX ORDER — ONDANSETRON 8 MG/1
4 TABLET, FILM COATED ORAL EVERY 8 HOURS
Refills: 0 | Status: DISCONTINUED | OUTPATIENT
Start: 2023-06-20 | End: 2023-06-23

## 2023-06-20 RX ORDER — DIPHENHYDRAMINE HCL 50 MG
50 CAPSULE ORAL EVERY 6 HOURS
Refills: 0 | Status: DISCONTINUED | OUTPATIENT
Start: 2023-06-20 | End: 2023-06-23

## 2023-06-20 RX ORDER — ACETAMINOPHEN 500 MG
650 TABLET ORAL EVERY 6 HOURS
Refills: 0 | Status: DISCONTINUED | OUTPATIENT
Start: 2023-06-20 | End: 2023-06-23

## 2023-06-20 RX ORDER — LOPERAMIDE HCL 2 MG
2 TABLET ORAL
Refills: 0 | Status: DISCONTINUED | OUTPATIENT
Start: 2023-06-20 | End: 2023-06-23

## 2023-06-20 RX ADMIN — Medication 50 MILLIGRAM(S): at 10:30

## 2023-06-20 RX ADMIN — Medication 1 MILLIGRAM(S): at 21:07

## 2023-06-20 RX ADMIN — Medication 1 MILLIGRAM(S): at 10:13

## 2023-06-20 RX ADMIN — Medication 650 MILLIGRAM(S): at 17:58

## 2023-06-20 RX ADMIN — Medication 0.5 MILLIGRAM(S): at 07:02

## 2023-06-20 RX ADMIN — Medication 0.1 MILLIGRAM(S): at 21:08

## 2023-06-20 RX ADMIN — ONDANSETRON 4 MILLIGRAM(S): 8 TABLET, FILM COATED ORAL at 13:52

## 2023-06-20 RX ADMIN — Medication 30 MILLILITER(S): at 13:52

## 2023-06-20 RX ADMIN — Medication 0.1 MILLIGRAM(S): at 14:44

## 2023-06-20 RX ADMIN — Medication 650 MILLIGRAM(S): at 07:02

## 2023-06-20 RX ADMIN — METHADONE HYDROCHLORIDE 30 MILLIGRAM(S): 40 TABLET ORAL at 10:01

## 2023-06-20 RX ADMIN — Medication 50 MILLIGRAM(S): at 21:20

## 2023-06-20 RX ADMIN — Medication 650 MILLIGRAM(S): at 18:29

## 2023-06-20 RX ADMIN — Medication 50 MILLIGRAM(S): at 09:12

## 2023-06-20 NOTE — BH INPATIENT PSYCHIATRY PROGRESS NOTE - PRN MEDS
MEDICATIONS  (PRN):  aluminum hydroxide/magnesium hydroxide/simethicone Suspension 30 milliLiter(s) Oral every 4 hours PRN Dyspepsia  haloperidol     Tablet 5 milliGRAM(s) Oral every 4 hours PRN agitation  loperamide 2 milliGRAM(s) Oral two times a day PRN diarrhea  LORazepam     Tablet 0.5 milliGRAM(s) Oral three times a day PRN anxiety  nicotine  Polacrilex Gum 2 milliGRAM(s) Oral every 3 hours PRN nicotine cessation  ondansetron    Tablet 4 milliGRAM(s) Oral every 8 hours PRN Vomitting  traZODone 50 milliGRAM(s) Oral at bedtime PRN insomnia   MEDICATIONS  (PRN):

## 2023-06-20 NOTE — BH INPATIENT PSYCHIATRY PROGRESS NOTE - NSBHATTESTBILLING_PSY_A_CORE
02661-Cdpvjjgjwl OBS or IP - high complexity OR 50-79 mins [id number]-Xhtnhmflbd OBS or IP - high complexity OR 50-79 mins [id number]-Iyymquzilj OBS or IP - high complexity OR 50-79 mins
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
## 2023-06-20 NOTE — BH PSYCHOLOGY - CLINICIAN PSYCHOTHERAPY NOTE - TOKEN PULL-DIAGNOSIS
Primary Diagnosis:    Problem Dx:   Acute exacerbation of chronic schizoaffective schizophrenia [F25.9]

## 2023-06-20 NOTE — BH CHART NOTE - NSEVENTNOTEFT_PSY_ALL_CORE
Writer called to floor after pt reported muscle tightness in her tongue and surrounding muscles. As per Nursing Report, pt initially had her neck turned upward when reporting her symptoms, but was soon after observed resting comfortably awaiting for the doctor to arrive on the unit. Pt was offered her evening medications but had difficulty swallowing them. Writer observed pt from afar, she was seated in a chair, comfortable, talking to hospital staff; her presentation atypical from previous episodes of acute dysototic reaction observed by this MD.  Of note, writer was present earlier this AM when pt had a very similar episode and had received Benadryl IM. As writer engaged with pt, writer was initially not able to understand pt's articulation, however her articulation quickly improved as evaluation continued and she appeared to be requesting IM Benadryl.  As writer was not certain of pt's request, pt was offered and accepted Benadryl 50mg PO, which she accepted and consumed without difficulty. Several minutes later pt reported to be feeling "better," with no muscle tightness.   After resolution of episode pt reported she was indeed requesting IM Benadryl. Pt was not able to take her Seroquel during her episode and refused a second dose when it was offered.   O: Pt calm, INAD, smiling at times, VSS, good eye contact  A: Pt reported muscle tightness while requesting IM Benadryl  P: 1) pt did not receive Seroquel this PM  2) Benadryl ordered 50mg PO q6hr prn stiffness/anxiety  3) Primary Team to evaluate in AM

## 2023-06-20 NOTE — BH INPATIENT PSYCHIATRY PROGRESS NOTE - NSBHCHARTREVIEWVS_PSY_A_CORE FT
Vital Signs Last 24 Hrs  T(C): 37.4 (06-20-23 @ 13:30), Max: 37.4 (06-20-23 @ 13:30)  T(F): 99.3 (06-20-23 @ 13:30), Max: 99.3 (06-20-23 @ 13:30)  HR: 71 (06-20-23 @ 13:30) (71 - 90)  BP: 143/85 (06-20-23 @ 13:30) (130/70 - 143/85)  BP(mean): --  RR: 16 (06-20-23 @ 13:30) (16 - 16)  SpO2: 100% (06-20-23 @ 13:30) (99% - 100%)     Vital Signs Last 24 Hrs  T(C): --  T(F): --  HR: --  BP: --  BP(mean): --  RR: --  SpO2: --

## 2023-06-20 NOTE — BH INPATIENT PSYCHIATRY PROGRESS NOTE - CURRENT MEDICATION
MEDICATIONS  (STANDING):  acetaminophen     Tablet .. 650 milliGRAM(s) Oral every 6 hours  ALPRAZolam 1 milliGRAM(s) Oral two times a day  cloNIDine 0.1 milliGRAM(s) Oral every 6 hours  haloperidol     Tablet 10 milliGRAM(s) Oral daily  methadone    Tablet 30 milliGRAM(s) Oral daily  QUEtiapine 100 milliGRAM(s) Oral at bedtime    MEDICATIONS  (PRN):  aluminum hydroxide/magnesium hydroxide/simethicone Suspension 30 milliLiter(s) Oral every 4 hours PRN Dyspepsia  haloperidol     Tablet 5 milliGRAM(s) Oral every 4 hours PRN agitation  loperamide 2 milliGRAM(s) Oral two times a day PRN diarrhea  LORazepam     Tablet 0.5 milliGRAM(s) Oral three times a day PRN anxiety  nicotine  Polacrilex Gum 2 milliGRAM(s) Oral every 3 hours PRN nicotine cessation  ondansetron    Tablet 4 milliGRAM(s) Oral every 8 hours PRN Vomitting  traZODone 50 milliGRAM(s) Oral at bedtime PRN insomnia   MEDICATIONS  (STANDING):    MEDICATIONS  (PRN):

## 2023-06-20 NOTE — BH INPATIENT PSYCHIATRY PROGRESS NOTE - NSBHMETABOLIC_PSY_ALL_CORE_FT
BMI: BMI (kg/m2): 24.6 (06-18-23 @ 22:10)  HbA1c: A1C with Estimated Average Glucose Result: 5.4 % (06-20-23 @ 05:30)    Glucose:   BP: 143/85 (06-20-23 @ 13:30) (99/60 - 143/85)  Lipid Panel: Date/Time: 06-20-23 @ 05:30  Cholesterol, Serum: 211  Direct LDL: --  HDL Cholesterol, Serum: 43  Total Cholesterol/HDL Ration Measurement: --  Triglycerides, Serum: 88   BMI: BMI (kg/m2): 24.6 (06-18-23 @ 22:10)  HbA1c: A1C with Estimated Average Glucose Result: 5.4 % (06-20-23 @ 05:30)    Glucose:   BP: 116/79 (06-23-23 @ 06:10) (104/71 - 126/85)  Lipid Panel: Date/Time: 06-20-23 @ 05:30  Cholesterol, Serum: 211  Direct LDL: --  HDL Cholesterol, Serum: 43  Total Cholesterol/HDL Ration Measurement: --  Triglycerides, Serum: 88
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
## 2023-06-20 NOTE — BH CHART NOTE - NSEVENTNOTEFT_PSY_ALL_CORE
Patient tearful; states to staff she feels stiff but mostly anxious and tearful  "What are you going to give me" speaks without effort.  Benadryl 50mg po offerred for anxiety ; appears anxious and depressed.  Not mentioning   suicidal or homicidal ideation intent or plans; no mention of auditory or visual hallucinations . Fair to good eye contact; speech clear spontaneous and normal volume

## 2023-06-21 LAB
ALBUMIN SERPL ELPH-MCNC: 4.9 G/DL — SIGNIFICANT CHANGE UP (ref 3.3–5)
ALP SERPL-CCNC: 52 U/L — SIGNIFICANT CHANGE UP (ref 40–120)
ALT FLD-CCNC: 18 U/L — SIGNIFICANT CHANGE UP (ref 10–45)
ANION GAP SERPL CALC-SCNC: 12 MMOL/L — SIGNIFICANT CHANGE UP (ref 5–17)
AST SERPL-CCNC: 26 U/L — SIGNIFICANT CHANGE UP (ref 10–40)
BILIRUB SERPL-MCNC: 0.8 MG/DL — SIGNIFICANT CHANGE UP (ref 0.2–1.2)
BUN SERPL-MCNC: 20 MG/DL — SIGNIFICANT CHANGE UP (ref 7–23)
CALCIUM SERPL-MCNC: 9.3 MG/DL — SIGNIFICANT CHANGE UP (ref 8.4–10.5)
CHLORIDE SERPL-SCNC: 102 MMOL/L — SIGNIFICANT CHANGE UP (ref 96–108)
CO2 SERPL-SCNC: 26 MMOL/L — SIGNIFICANT CHANGE UP (ref 22–31)
CREAT SERPL-MCNC: 0.73 MG/DL — SIGNIFICANT CHANGE UP (ref 0.5–1.3)
EGFR: 111 ML/MIN/1.73M2 — SIGNIFICANT CHANGE UP
GLUCOSE SERPL-MCNC: 126 MG/DL — HIGH (ref 70–99)
HCT VFR BLD CALC: 40.6 % — SIGNIFICANT CHANGE UP (ref 34.5–45)
HGB BLD-MCNC: 13.4 G/DL — SIGNIFICANT CHANGE UP (ref 11.5–15.5)
LIDOCAIN IGE QN: 24 U/L — SIGNIFICANT CHANGE UP (ref 7–60)
MCHC RBC-ENTMCNC: 29.7 PG — SIGNIFICANT CHANGE UP (ref 27–34)
MCHC RBC-ENTMCNC: 33 GM/DL — SIGNIFICANT CHANGE UP (ref 32–36)
MCV RBC AUTO: 90 FL — SIGNIFICANT CHANGE UP (ref 80–100)
NRBC # BLD: 0 /100 WBCS — SIGNIFICANT CHANGE UP (ref 0–0)
PLATELET # BLD AUTO: 265 K/UL — SIGNIFICANT CHANGE UP (ref 150–400)
POTASSIUM SERPL-MCNC: 4.2 MMOL/L — SIGNIFICANT CHANGE UP (ref 3.5–5.3)
POTASSIUM SERPL-SCNC: 4.2 MMOL/L — SIGNIFICANT CHANGE UP (ref 3.5–5.3)
PROT SERPL-MCNC: 8.3 G/DL — SIGNIFICANT CHANGE UP (ref 6–8.3)
RBC # BLD: 4.51 M/UL — SIGNIFICANT CHANGE UP (ref 3.8–5.2)
RBC # FLD: 12.7 % — SIGNIFICANT CHANGE UP (ref 10.3–14.5)
SODIUM SERPL-SCNC: 140 MMOL/L — SIGNIFICANT CHANGE UP (ref 135–145)
WBC # BLD: 11.65 K/UL — HIGH (ref 3.8–10.5)
WBC # FLD AUTO: 11.65 K/UL — HIGH (ref 3.8–10.5)

## 2023-06-21 PROCEDURE — 99232 SBSQ HOSP IP/OBS MODERATE 35: CPT

## 2023-06-21 RX ORDER — BENZTROPINE MESYLATE 1 MG
1 TABLET ORAL
Refills: 0 | Status: DISCONTINUED | OUTPATIENT
Start: 2023-06-21 | End: 2023-06-23

## 2023-06-21 RX ORDER — QUETIAPINE FUMARATE 200 MG/1
50 TABLET, FILM COATED ORAL EVERY 8 HOURS
Refills: 0 | Status: DISCONTINUED | OUTPATIENT
Start: 2023-06-21 | End: 2023-06-21

## 2023-06-21 RX ORDER — DIPHENHYDRAMINE HCL 50 MG
50 CAPSULE ORAL ONCE
Refills: 0 | Status: COMPLETED | OUTPATIENT
Start: 2023-06-21 | End: 2023-06-21

## 2023-06-21 RX ORDER — QUETIAPINE FUMARATE 200 MG/1
50 TABLET, FILM COATED ORAL EVERY 8 HOURS
Refills: 0 | Status: DISCONTINUED | OUTPATIENT
Start: 2023-06-21 | End: 2023-06-23

## 2023-06-21 RX ADMIN — QUETIAPINE FUMARATE 100 MILLIGRAM(S): 200 TABLET, FILM COATED ORAL at 21:41

## 2023-06-21 RX ADMIN — Medication 0.1 MILLIGRAM(S): at 13:02

## 2023-06-21 RX ADMIN — HALOPERIDOL DECANOATE 10 MILLIGRAM(S): 100 INJECTION INTRAMUSCULAR at 10:15

## 2023-06-21 RX ADMIN — METHADONE HYDROCHLORIDE 30 MILLIGRAM(S): 40 TABLET ORAL at 10:15

## 2023-06-21 RX ADMIN — Medication 50 MILLIGRAM(S): at 17:39

## 2023-06-21 RX ADMIN — Medication 0.1 MILLIGRAM(S): at 17:39

## 2023-06-21 RX ADMIN — Medication 1 MILLIGRAM(S): at 21:41

## 2023-06-21 RX ADMIN — Medication 1 MILLIGRAM(S): at 10:16

## 2023-06-21 RX ADMIN — Medication 0.1 MILLIGRAM(S): at 06:02

## 2023-06-21 RX ADMIN — Medication 50 MILLIGRAM(S): at 17:55

## 2023-06-21 RX ADMIN — Medication 0.1 MILLIGRAM(S): at 23:23

## 2023-06-21 NOTE — BH INPATIENT PSYCHIATRY PROGRESS NOTE - CURRENT MEDICATION
MEDICATIONS  (STANDING):  ALPRAZolam 1 milliGRAM(s) Oral two times a day  cloNIDine 0.1 milliGRAM(s) Oral every 6 hours  haloperidol     Tablet 10 milliGRAM(s) Oral daily  methadone    Tablet 30 milliGRAM(s) Oral daily  QUEtiapine 100 milliGRAM(s) Oral at bedtime    MEDICATIONS  (PRN):  acetaminophen     Tablet .. 650 milliGRAM(s) Oral every 6 hours PRN Temp greater or equal to 38C (100.4F), Mild Pain (1 - 3), Moderate Pain (4 - 6), Severe Pain (7 - 10)  aluminum hydroxide/magnesium hydroxide/simethicone Suspension 30 milliLiter(s) Oral every 4 hours PRN Dyspepsia  diphenhydrAMINE 50 milliGRAM(s) Oral every 6 hours PRN anxiety/stiffness  haloperidol     Tablet 5 milliGRAM(s) Oral every 4 hours PRN agitation  loperamide 2 milliGRAM(s) Oral two times a day PRN diarrhea  LORazepam     Tablet 0.5 milliGRAM(s) Oral three times a day PRN anxiety  nicotine  Polacrilex Gum 2 milliGRAM(s) Oral every 3 hours PRN nicotine cessation  ondansetron    Tablet 4 milliGRAM(s) Oral every 8 hours PRN Vomitting  traZODone 50 milliGRAM(s) Oral at bedtime PRN insomnia   MEDICATIONS  (STANDING):    MEDICATIONS  (PRN):

## 2023-06-21 NOTE — BH INPATIENT PSYCHIATRY PROGRESS NOTE - NSBHMETABOLIC_PSY_ALL_CORE_FT
BMI: BMI (kg/m2): 24.6 (06-18-23 @ 22:10)  HbA1c: A1C with Estimated Average Glucose Result: 5.4 % (06-20-23 @ 05:30)    Glucose:   BP: 123/85 (06-21-23 @ 12:45) (99/60 - 143/85)  Lipid Panel: Date/Time: 06-20-23 @ 05:30  Cholesterol, Serum: 211  Direct LDL: --  HDL Cholesterol, Serum: 43  Total Cholesterol/HDL Ration Measurement: --  Triglycerides, Serum: 88   BMI: BMI (kg/m2): 24.6 (06-18-23 @ 22:10)  HbA1c: A1C with Estimated Average Glucose Result: 5.4 % (06-20-23 @ 05:30)    Glucose:   BP: 116/79 (06-23-23 @ 06:10) (104/71 - 126/85)  Lipid Panel: Date/Time: 06-20-23 @ 05:30  Cholesterol, Serum: 211  Direct LDL: --  HDL Cholesterol, Serum: 43  Total Cholesterol/HDL Ration Measurement: --  Triglycerides, Serum: 88
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
## 2023-06-21 NOTE — BH SAFETY PLAN - SUICIDE PREVENTION LIFELINE PHONES
Suicide Prevention Lifeline Phone: [phone] Suicide Prevention Lifeline Phone: [phone] ([phone] Suicide Prevention Lifeline Phone: [phone] TALK ([phone])

## 2023-06-21 NOTE — BH DISCHARGE NOTE NURSING/SOCIAL WORK/PSYCH REHAB - NSBHDCADDR2FT_A_CORE
[address] [address] [address] [address] [address] [id number]

## 2023-06-21 NOTE — BH DISCHARGE NOTE NURSING/SOCIAL WORK/PSYCH REHAB - NSDCADDINFO2FT_PSY_ALL_CORE
Bring Discharge documents, Start ID card, insurance cards and phot ID. Please make sure to bring discharge documents as this is required by the clinic to be serviced.

## 2023-06-21 NOTE — BH DISCHARGE NOTE NURSING/SOCIAL WORK/PSYCH REHAB - NSCDUDCCRISIS_PSY_A_CORE
[facility] Well  1 [phone]) [facility] ([phone])  Text "WELL" to 10522  Website: www.[employer]Way/.National Suicide Prevention Lifeline 9 (463) 714-0285/.  [employer]  1 (954) LIFENET (552-9293)/988 Suicide and Crisis Lifeline [facility] Well  1 (509) [facility][phone][facility]  Text "WELL" to 48562  Website: www.[employer]/.National Suicide Prevention Lifeline 1 (459) 679-0445/.  [employer]  1 (760) LIFENET (061-9334)/988 Suicide and Crisis Lifeline [facility] Well  1 (447) [facility] ([phone])  Text "WELL" to 21390  Website: www.[employer].Friday/.National Suicide Prevention Lifeline 8 (936) 553-0100/.  [employer]  1 (711) LIFENET (739-2907)/988 Suicide and Crisis Lifeline

## 2023-06-21 NOTE — BH DISCHARGE NOTE NURSING/SOCIAL WORK/PSYCH REHAB - PATIENT PORTAL LINK FT
You can access the FollowMyHealth Patient Portal offered by [facility] by registering at the following website: [email or web address]. By joining [employer]’s FollowMyHealth portal, you will also be able to view your health information using other applications (apps) compatible with our system. You can access the FollowMyHealth Patient Portal offered by [facility] by registering at the following website: [email or web address]. By joining [employer]’s FollowMyHealth portal, you will also be able to view your health information using other applications (apps) compatible with our system. You can access the FollowMyHealth Patient Portal offered by [facility] by registering at the following website: [email or web address]. By joining [employer]’s FollowMyHealth portal, you will also be able to view your health information using other applications (apps) compatible with our system.

## 2023-06-21 NOTE — BH INPATIENT PSYCHIATRY PROGRESS NOTE - NSBHATTESTBILLING_PSY_A_CORE
94760-Bufctievxw OBS or IP - moderate complexity OR 35-49 mins 17251-Hdrbnjthgh OBS or IP - moderate complexity OR 35-49 mins 06754-Wftdcvmxfk OBS or IP - moderate complexity OR 35-49 mins

## 2023-06-21 NOTE — BH INPATIENT PSYCHIATRY PROGRESS NOTE - NSBHATTESTCOMMENTATTENDFT_PSY_A_CORE
Pt nauseous today with observed vomiting. Opioid wdl likely as pt also had rhinorrrhea.  and had a suspicious episode of oculogyric  crisis that did not seem genuine. Conversion disorder a strong possibility. 
Pt feeling better. Put in a 72 hour letter. Conversion disorder suspected as she keeps having episodes that seem to have elements of non-epileptic seizure. She had an episode of torticollis which turned into seizure-like episode with odd extensiuons of her legs straight up ion the air. I suppose seizure d/o might be in diffrential but no LOC.

## 2023-06-21 NOTE — BH DISCHARGE NOTE NURSING/SOCIAL WORK/PSYCH REHAB - NSBHDCAPPT2DT_PSY_A_CORE
6/4/2018 at 0908 a.m. right  implanted port accessed using a 20 gauge non-coring needle primed with 0.9% sodium chloride.  Good blood return obtained.  Blood obtained and sent to lab. Flushed with 20ml 0.9% sodium chloride. Implanted port site is not sensitive to touch, not swollen, not warm and not reddened.  Patient tolerated procedure well.     23-Jun-2023 14:00

## 2023-06-21 NOTE — BH DISCHARGE NOTE NURSING/SOCIAL WORK/PSYCH REHAB - [facility]
Unit Name: [address] Phone Number: [phone] Unit Name: [address] Phone Number: [phone] Unit Name: [address] Phone Number: [phone]

## 2023-06-21 NOTE — BH SAFETY PLAN - SUICIDE AND CRISIS LIFELINE, CALL [phone]
Suicide and Crisis Lifeline, call 084 Suicide and Crisis Lifeline, call 802 Suicide and Crisis Lifeline, call [phone]

## 2023-06-21 NOTE — BH INPATIENT PSYCHIATRY PROGRESS NOTE - PRN MEDS
MEDICATIONS  (PRN):  acetaminophen     Tablet .. 650 milliGRAM(s) Oral every 6 hours PRN Temp greater or equal to 38C (100.4F), Mild Pain (1 - 3), Moderate Pain (4 - 6), Severe Pain (7 - 10)  aluminum hydroxide/magnesium hydroxide/simethicone Suspension 30 milliLiter(s) Oral every 4 hours PRN Dyspepsia  diphenhydrAMINE 50 milliGRAM(s) Oral every 6 hours PRN anxiety/stiffness  haloperidol     Tablet 5 milliGRAM(s) Oral every 4 hours PRN agitation  loperamide 2 milliGRAM(s) Oral two times a day PRN diarrhea  LORazepam     Tablet 0.5 milliGRAM(s) Oral three times a day PRN anxiety  nicotine  Polacrilex Gum 2 milliGRAM(s) Oral every 3 hours PRN nicotine cessation  ondansetron    Tablet 4 milliGRAM(s) Oral every 8 hours PRN Vomitting  traZODone 50 milliGRAM(s) Oral at bedtime PRN insomnia   MEDICATIONS  (PRN):

## 2023-06-21 NOTE — BH DISCHARGE NOTE NURSING/SOCIAL WORK/PSYCH REHAB - NSDCPRGOAL_PSY_ALL_CORE
Over the course of tx., pt. was social with select peers; after a period of isolation, pt. attended a few open studio groups.  pt. endorsed somatic symptoms throughout admission, primary muscle aches and lethargy.  pt. was able to identify warnings, triggers, and coping strategies to manage future symptoms of mental illness; pt. also endorsed a small but strong support system she could rely on in a crisis. pt. had full range of affect and presented a brighter disposition since admission; pt. was future-oriented and named appropriate coping strategies to combat symptoms and temptation to start using drugs when triggered

## 2023-06-21 NOTE — BH DISCHARGE NOTE NURSING/SOCIAL WORK/PSYCH REHAB - NSDCOTHERNAME_GEN_ALL_CORE
[facility] Well [facility] Well [facility] Well

## 2023-06-21 NOTE — BH TREATMENT PLAN - NSTXPROBPOTSD_PSY_ALL_CORE
POST-TRAUMATIC STRESS Alert-The patient is alert, awake and responds to voice. The patient is oriented to time, place, and person. The triage nurse is able to obtain subjective information.

## 2023-06-21 NOTE — BH INPATIENT PSYCHIATRY PROGRESS NOTE - NSBHCHARTREVIEWVS_PSY_A_CORE FT
Vital Signs Last 24 Hrs  T(C): 37 (06-21-23 @ 08:29), Max: 37 (06-21-23 @ 08:29)  T(F): 98.6 (06-21-23 @ 08:29), Max: 98.6 (06-21-23 @ 08:29)  HR: 80 (06-21-23 @ 12:45) (79 - 118)  BP: 123/85 (06-21-23 @ 12:45) (123/85 - 136/66)  BP(mean): --  RR: 18 (06-21-23 @ 08:29) (17 - 18)  SpO2: 96% (06-21-23 @ 08:29) (96% - 100%)     Vital Signs Last 24 Hrs  T(C): --  T(F): --  HR: --  BP: --  BP(mean): --  RR: --  SpO2: --

## 2023-06-21 NOTE — BH DISCHARGE NOTE NURSING/SOCIAL WORK/PSYCH REHAB - NSTOBACCOOTHER_PSY_ALL_CORE_FT
Call [facility] Smokers' Quitline at [phone] ([phone]) or visit [email or web address] Call [facility] Smokers' Quitline at [phone] ([phone]) or visit [email or web address] Call [facility] Smokers' Quitline at [phone] ([phone]) or visit [email or web address]

## 2023-06-22 PROCEDURE — 99231 SBSQ HOSP IP/OBS SF/LOW 25: CPT

## 2023-06-22 RX ADMIN — Medication 0.1 MILLIGRAM(S): at 06:39

## 2023-06-22 RX ADMIN — Medication 0.1 MILLIGRAM(S): at 13:42

## 2023-06-22 RX ADMIN — QUETIAPINE FUMARATE 100 MILLIGRAM(S): 200 TABLET, FILM COATED ORAL at 22:47

## 2023-06-22 RX ADMIN — Medication 1 MILLIGRAM(S): at 22:46

## 2023-06-22 RX ADMIN — Medication 0.1 MILLIGRAM(S): at 18:55

## 2023-06-22 RX ADMIN — Medication 1 MILLIGRAM(S): at 10:17

## 2023-06-22 RX ADMIN — Medication 1 MILLIGRAM(S): at 10:18

## 2023-06-22 RX ADMIN — METHADONE HYDROCHLORIDE 30 MILLIGRAM(S): 40 TABLET ORAL at 10:17

## 2023-06-22 NOTE — BH INPATIENT PSYCHIATRY PROGRESS NOTE - NSBHATTESTAPPBILLTIME_PSY_A_CORE
I attest my time as BHARAT is greater than 50% of the total combined time spent on qualifying patient care activities. I have reviewed and verified the documentation.

## 2023-06-22 NOTE — BH INPATIENT PSYCHIATRY PROGRESS NOTE - CURRENT MEDICATION
MEDICATIONS  (STANDING):  ALPRAZolam 1 milliGRAM(s) Oral two times a day  benztropine 1 milliGRAM(s) Oral two times a day  cloNIDine 0.1 milliGRAM(s) Oral every 6 hours  methadone    Tablet 30 milliGRAM(s) Oral daily  QUEtiapine 100 milliGRAM(s) Oral at bedtime    MEDICATIONS  (PRN):  acetaminophen     Tablet .. 650 milliGRAM(s) Oral every 6 hours PRN Temp greater or equal to 38C (100.4F), Mild Pain (1 - 3), Moderate Pain (4 - 6), Severe Pain (7 - 10)  aluminum hydroxide/magnesium hydroxide/simethicone Suspension 30 milliLiter(s) Oral every 4 hours PRN Dyspepsia  diphenhydrAMINE 50 milliGRAM(s) Oral every 6 hours PRN anxiety/stiffness  loperamide 2 milliGRAM(s) Oral two times a day PRN diarrhea  LORazepam     Tablet 0.5 milliGRAM(s) Oral three times a day PRN anxiety  nicotine  Polacrilex Gum 2 milliGRAM(s) Oral every 3 hours PRN nicotine cessation  ondansetron    Tablet 4 milliGRAM(s) Oral every 8 hours PRN Vomitting  QUEtiapine 50 milliGRAM(s) Oral every 8 hours PRN agitatoin  traZODone 50 milliGRAM(s) Oral at bedtime PRN insomnia

## 2023-06-22 NOTE — BH INPATIENT PSYCHIATRY PROGRESS NOTE - NSBHCONSDANGERSELF_PSY_A_CORE
It was a pleasure to see you in clinic today.   Here is what we discussed:    Continue Advair twice daily, rinsing mouth after use.  When inhaler is done, switch to Symbicort 2 puffs twice daily, rinse mouth after use.  If medication is too expensive to fill, call us.  Continue albuterol inhaler as needed for wheezing, shortness of breath.  Start singulair 10mg once daily at bedtime.  Start flonase one spray once daily, can increase to twice daily when allergies are bad.  Trial saline spray/rinses.  Use peak flow meter intermittently, writing down number and bring to next visit.  We are sending you a referral to allergy, and ENT for possible vocal cord dysfunction.  They will call you to schedule.  We will have a chest CT repeated, we will call you with results.  Call us with any worsening of your breathing.  Follow-up in one month.    Thais Horton, CNP  Pulmonary Medicine  [facility]  [phone]    
suicidal behavior

## 2023-06-22 NOTE — BH INPATIENT PSYCHIATRY PROGRESS NOTE - NSDCCRITERIA_PSY_ALL_CORE
no longer suicidal 
Patient is able to maintain behavioral control and denies suicidal ideation.
Patient is able to maintain behavioral control and denies suicidal ideation.
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
## 2023-06-22 NOTE — BH INPATIENT PSYCHIATRY PROGRESS NOTE - NSBHMETABOLIC_PSY_ALL_CORE_FT
Impression: Corneal transplant status: Z94.7.
- s/p PKP OS 10/18/21 Plan: POW#7, doing well. Edema resolving. Surface dryness. Cont pred QID until 12/18 then decrease to TID, do not stop. Cont ATs QID Cont erythro jace qhs, aggressive lubrication Cont ACV 400mg qd. Precautions reviewed. BMI: BMI (kg/m2): 24.6 (06-18-23 @ 22:10)  HbA1c: A1C with Estimated Average Glucose Result: 5.4 % (06-20-23 @ 05:30)    Glucose:   BP: 104/71 (06-22-23 @ 16:56) (104/71 - 143/85)  Lipid Panel: Date/Time: 06-20-23 @ 05:30  Cholesterol, Serum: 211  Direct LDL: --  HDL Cholesterol, Serum: 43  Total Cholesterol/HDL Ration Measurement: --  Triglycerides, Serum: 88

## 2023-06-22 NOTE — BH INPATIENT PSYCHIATRY PROGRESS NOTE - NSTXPOTSDGOAL_PSY_ALL_CORE
Will identify 3 calming strategies to manage symptoms of trauma

## 2023-06-22 NOTE — BH INPATIENT PSYCHIATRY PROGRESS NOTE - NSTXIMPULSGOAL_PSY_ALL_CORE
Will be able to demonstrate the ability to pause before acting out negatively

## 2023-06-22 NOTE — BH INPATIENT PSYCHIATRY PROGRESS NOTE - NSICDXBHPRIMARYDX_PSY_ALL_CORE
Post traumatic stress disorder (PTSD)   F43.10  
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
## 2023-06-22 NOTE — BH INPATIENT PSYCHIATRY PROGRESS NOTE - NSBHATTESTBILLING_PSY_A_CORE
57842-Xjyrxxvswp OBS or IP - low complexity OR 25-34 mins 94700-Oyajfcgale OBS or IP - low complexity OR 25-34 mins 90063-Tlmdrjclty OBS or IP - low complexity OR 25-34 mins

## 2023-06-22 NOTE — BH INPATIENT PSYCHIATRY PROGRESS NOTE - NSBHATTESTTYPEVISIT_PSY_A_CORE
On-site Attending supervising BHARAT (99XXX codes)
Attending with Resident/Fellow/Student
Attending with Resident/Fellow/Student

## 2023-06-22 NOTE — BH INPATIENT PSYCHIATRY PROGRESS NOTE - PRN MEDS
MEDICATIONS  (PRN):  acetaminophen     Tablet .. 650 milliGRAM(s) Oral every 6 hours PRN Temp greater or equal to 38C (100.4F), Mild Pain (1 - 3), Moderate Pain (4 - 6), Severe Pain (7 - 10)  aluminum hydroxide/magnesium hydroxide/simethicone Suspension 30 milliLiter(s) Oral every 4 hours PRN Dyspepsia  diphenhydrAMINE 50 milliGRAM(s) Oral every 6 hours PRN anxiety/stiffness  loperamide 2 milliGRAM(s) Oral two times a day PRN diarrhea  LORazepam     Tablet 0.5 milliGRAM(s) Oral three times a day PRN anxiety  nicotine  Polacrilex Gum 2 milliGRAM(s) Oral every 3 hours PRN nicotine cessation  ondansetron    Tablet 4 milliGRAM(s) Oral every 8 hours PRN Vomitting  QUEtiapine 50 milliGRAM(s) Oral every 8 hours PRN agitatoin  traZODone 50 milliGRAM(s) Oral at bedtime PRN insomnia

## 2023-06-22 NOTE — BH INPATIENT PSYCHIATRY PROGRESS NOTE - NSBHMSEBEHAV_PSY_A_CORE
pt vomiting intermittently/Cooperative

## 2023-06-22 NOTE — BH INPATIENT PSYCHIATRY PROGRESS NOTE - NSBHCHARTREVIEWVS_PSY_A_CORE FT
Vital Signs Last 24 Hrs  T(C): 36.7 (06-22-23 @ 16:56), Max: 36.9 (06-22-23 @ 06:09)  T(F): 98.1 (06-22-23 @ 16:56), Max: 98.5 (06-22-23 @ 06:09)  HR: 65 (06-22-23 @ 16:56) (65 - 99)  BP: 104/71 (06-22-23 @ 16:56) (104/71 - 126/85)  BP(mean): --  RR: 18 (06-22-23 @ 16:56) (18 - 18)  SpO2: 98% (06-22-23 @ 16:56) (98% - 99%)

## 2023-06-22 NOTE — BH INPATIENT PSYCHIATRY PROGRESS NOTE - NSTXSUBMISGOAL_PSY_ALL_CORE
Be able to verbalize an understanding of the association between substance abuse and mental health

## 2023-06-22 NOTE — BH INPATIENT PSYCHIATRY PROGRESS NOTE - NSTXDEPRESGOAL_PSY_ALL_CORE
Report using a coping skill to overcome sadness and worry in order to socialize with peers daily
Exhibit improvements in self-grooming, hygiene, sleep and appetite
Exhibit improvements in self-grooming, hygiene, sleep and appetite

## 2023-06-23 VITALS
SYSTOLIC BLOOD PRESSURE: 116 MMHG | OXYGEN SATURATION: 97 % | RESPIRATION RATE: 18 BRPM | DIASTOLIC BLOOD PRESSURE: 79 MMHG | HEART RATE: 74 BPM | TEMPERATURE: 98 F

## 2023-06-23 PROCEDURE — 83690 ASSAY OF LIPASE: CPT

## 2023-06-23 PROCEDURE — 81003 URINALYSIS AUTO W/O SCOPE: CPT

## 2023-06-23 PROCEDURE — 83036 HEMOGLOBIN GLYCOSYLATED A1C: CPT

## 2023-06-23 PROCEDURE — 80053 COMPREHEN METABOLIC PANEL: CPT

## 2023-06-23 PROCEDURE — 84702 CHORIONIC GONADOTROPIN TEST: CPT

## 2023-06-23 PROCEDURE — 85025 COMPLETE CBC W/AUTO DIFF WBC: CPT

## 2023-06-23 PROCEDURE — 36415 COLL VENOUS BLD VENIPUNCTURE: CPT

## 2023-06-23 PROCEDURE — 76700 US EXAM ABDOM COMPLETE: CPT

## 2023-06-23 PROCEDURE — 80048 BASIC METABOLIC PNL TOTAL CA: CPT

## 2023-06-23 PROCEDURE — 99239 HOSP IP/OBS DSCHRG MGMT >30: CPT

## 2023-06-23 PROCEDURE — 80307 DRUG TEST PRSMV CHEM ANLYZR: CPT

## 2023-06-23 PROCEDURE — 80061 LIPID PANEL: CPT

## 2023-06-23 PROCEDURE — 99285 EMERGENCY DEPT VISIT HI MDM: CPT

## 2023-06-23 PROCEDURE — 80076 HEPATIC FUNCTION PANEL: CPT

## 2023-06-23 PROCEDURE — 85027 COMPLETE CBC AUTOMATED: CPT

## 2023-06-23 RX ORDER — NICOTINE POLACRILEX 2 MG
1 GUM BUCCAL
Qty: 240 | Refills: 0
Start: 2023-06-23 | End: 2023-07-22

## 2023-06-23 RX ORDER — BENZTROPINE MESYLATE 1 MG
1 TABLET ORAL
Qty: 30 | Refills: 0
Start: 2023-06-23 | End: 2023-07-07

## 2023-06-23 RX ORDER — QUETIAPINE FUMARATE 200 MG/1
1 TABLET, FILM COATED ORAL
Qty: 15 | Refills: 0
Start: 2023-06-23 | End: 2023-07-07

## 2023-06-23 RX ORDER — ALPRAZOLAM 0.25 MG
1 TABLET ORAL
Qty: 30 | Refills: 0
Start: 2023-06-23 | End: 2023-07-07

## 2023-06-23 RX ADMIN — Medication 1 MILLIGRAM(S): at 10:12

## 2023-06-23 RX ADMIN — Medication 0.1 MILLIGRAM(S): at 10:11

## 2023-06-23 RX ADMIN — METHADONE HYDROCHLORIDE 30 MILLIGRAM(S): 40 TABLET ORAL at 10:12

## 2023-06-23 RX ADMIN — Medication 0.1 MILLIGRAM(S): at 00:36

## 2023-06-23 NOTE — BH INPATIENT PSYCHIATRY DISCHARGE NOTE - REASON FOR ADMISSION
Patient admitted after attempting to climb out a window secondary to visions of past traumatic event. 31 YO [place], bilingual F with extensive hx of trauma and opioid use d/o with hx of being on methadone admitted after attempting to climb out a window secondary to visions of past traumatic event. On admission pt retracted all suicidality and denied any intent of going out window.  31 YO [place], bilingual F with extensive hx of trauma and opioid use d/o with hx of being on methadone admitted after attempting to climb out a window secondary to visions of past traumatic event. On admission pt retracted all suicidality and denied any intent of going out window.  31 YO [place]ian, bilingual F with extensive hx of trauma and opioid use d/o with hx of being on methadone admitted after attempting to climb out a window secondary to visions of past traumatic event. On admission pt retracted all suicidality and denied any intent of going out window.

## 2023-06-23 NOTE — BH INPATIENT PSYCHIATRY DISCHARGE NOTE - ATTENDING DISCHARGE PHYSICAL EXAMINATION:
MSE- well groomed and related, good EC, -PMR/A Speech: wnl mood: "I'm doing well." Affect: full-range, appropriate TP: linear TC: -SI/HI/AH/VH/PI I&J: good

## 2023-06-23 NOTE — BH INPATIENT PSYCHIATRY DISCHARGE NOTE - NSBHFUPINTERVALHXFT_PSY_A_CORE
She was pleasant and better groomed on assessment today. She is looking forward to leaving and denies thoughts of suicidal ideation at this time. She reports her mood is "so much better" and "I plan on staying away from anything that will make me feel that way again". She is in agreement with going to the outpatient methadone clinic upon discharge. She is aware that her that her medications have been modified at this time. She denies any perceptual changes at this time.
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
## 2023-06-23 NOTE — BH INPATIENT PSYCHIATRY DISCHARGE NOTE - NSBHMETABOLIC_PSY_ALL_CORE_FT
BMI: BMI (kg/m2): 24.6 (06-18-23 @ 22:10)  HbA1c: A1C with Estimated Average Glucose Result: 5.4 % (06-20-23 @ 05:30)    Glucose:   BP: 116/79 (06-23-23 @ 06:10) (104/71 - 143/85)  Lipid Panel: Date/Time: 06-20-23 @ 05:30  Cholesterol, Serum: 211  Direct LDL: --  HDL Cholesterol, Serum: 43  Total Cholesterol/HDL Ration Measurement: --  Triglycerides, Serum: 88

## 2023-06-23 NOTE — BH INPATIENT PSYCHIATRY DISCHARGE NOTE - OTHER PAST PSYCHIATRIC HISTORY (INCLUDE DETAILS REGARDING ONSET, COURSE OF ILLNESS, INPATIENT/OUTPATIENT TREATMENT)
Note in progress Patient has had 3 psychiatric admits, most recently 1 year ago related to similar episode of flashback or vision related to trauma

## 2023-06-23 NOTE — BH INPATIENT PSYCHIATRY DISCHARGE NOTE - NSBHSUICIDESTATUS_PSY_ALL_CORE
Patient currently denies suicidal ideation on assessment and denied it since arriving in the unit. She has an extensive history of trauma and substance use that contribute to her overall chronic risk.
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
## 2023-06-23 NOTE — BH INPATIENT PSYCHIATRY DISCHARGE NOTE - NSBHDCBILLING_PSY_ALL_CORE
38073 (Hospital discharge day management; more than 30 min) 20874 (Hospital discharge day management; more than 30 min) 92240 (Hospital discharge day management; more than 30 min)
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
## 2023-06-23 NOTE — BH INPATIENT PSYCHIATRY DISCHARGE NOTE - NSDCMRMEDTOKEN_GEN_ALL_CORE_FT
ALPRAZolam 1 mg oral tablet: 1 tab(s) orally 2 times a day MDD: 2 mg  benztropine 1 mg oral tablet: 1 tab(s) orally 2 times a day MDD: 2 mg  cloNIDine 0.1 mg oral tablet: 1 tab(s) orally every 6 hours MDD: 0.3 mg  Leader Nicotine Polacrilex 2 mg oral transmucosal gum: 1 gum chewed every 3 hours MDD: 6 mg  QUEtiapine 100 mg oral tablet: 1 tab(s) orally once a day (at bedtime) MDD: 100 mg

## 2023-06-23 NOTE — BH INPATIENT PSYCHIATRY DISCHARGE NOTE - NSDCCPCAREPLAN_GEN_ALL_CORE_FT
PRINCIPAL DISCHARGE DIAGNOSIS  Diagnosis: Post traumatic stress disorder (PTSD)  Assessment and Plan of Treatment:       SECONDARY DISCHARGE DIAGNOSES  Diagnosis: Cigarette nicotine dependence without complication  Assessment and Plan of Treatment:     Diagnosis: Severe opioid use disorder  Assessment and Plan of Treatment:

## 2023-06-27 DIAGNOSIS — F11.20 OPIOID DEPENDENCE, UNCOMPLICATED: ICD-10-CM

## 2023-06-27 DIAGNOSIS — F17.210 NICOTINE DEPENDENCE, CIGARETTES, UNCOMPLICATED: ICD-10-CM

## 2023-06-27 DIAGNOSIS — F43.10 POST-TRAUMATIC STRESS DISORDER, UNSPECIFIED: ICD-10-CM

## 2023-06-27 DIAGNOSIS — F25.9 SCHIZOAFFECTIVE DISORDER, UNSPECIFIED: ICD-10-CM

## 2023-06-27 DIAGNOSIS — F11.90 OPIOID USE, UNSPECIFIED, UNCOMPLICATED: ICD-10-CM

## 2023-10-24 NOTE — BH SOCIAL WORK CONFIRMATION FOLLOW UP NOTE - NSCOMMENTS_PSY_ALL_CORE
Caring call- unable to be reached; Ignacia( creative art therapist ) called 6/24 and person who answered stated she had the wrong number.     linkage call: Writer unable to reach agency as it has a busy dial tone.  Caring call- unable to be reached; Ignacia( creative art therapist ) called 6/24 and person who answered stated she had the wrong number.     linkage call 6/23/23: Writer unable to reach agency as it has a busy dial tone.   [address] [facility] [address]   Caring call- unable to be reached; Ignacia( creative art therapist ) called 6/24 and person who answered stated she had the wrong number.     linkage call 6/23/23: Writer unable to reach agency as it has a busy dial tone.   [address] [facility] [address]   Caring call- unable to be reached; Ignacia( creative art therapist ) called 6/24 and person who answered stated she had the wrong number.     linkage call 6/23/23: Writer unable to reach agency as it has a busy dial tone.   [address] [facility] [address]   15

## 2023-11-15 NOTE — BH TREATMENT PLAN - NSTXSUBMISDATEEST_PSY_ALL_CORE
11/15/23     Chief Complaint   Patient presents with    Mood Swings     3 month f/u foor mood, memory, and skin lesion biopsy. Has noticed improvement. HPI    Here for 3 month follow up. Overall doing well and feeling well. Just returned from [facility] - went with 2 sons. Doing well on lexapro since he started taking it in the evenings. Taking buspar BID and using the third dose as needed. Doing well on namenda and reports he has had better memory recall. Seeing Dr. Italia Bello with cardiology for CAD, HLD - reports doing well. Right hand numbness, chronic and ongoing. He had his left carpal tunnel surgery complete and not sure he wants to have his right hand done. WILLIAM- recently saw the sleep specialist and is doing well on cpap. Allergies   Allergen Reactions    Bactrim [Sulfamethoxazole-Trimethoprim]      Felt like he was in a tunnel and also headache    Mucinex [Guaifenesin Er]      Anxiety     Z-Azeem [Azithromycin] Other (See Comments)     Anxiety, uneasy feeling panicky feeling.        Current Outpatient Medications   Medication Sig Dispense Refill    doxycycline hyclate (VIBRAMYCIN) 100 MG capsule       cephALEXin (KEFLEX) 500 MG capsule       iron-vitamin C  MG TABS Take by mouth daily      aspirin 81 MG EC tablet Take 1 tablet by mouth daily 90 tablet 3    rosuvastatin (CRESTOR) 20 MG tablet Take 1 tablet by mouth daily 90 tablet 3    furosemide (LASIX) 40 MG tablet TAKE 1 TABLET BY MOUTH DAILY 30 tablet 3    memantine (NAMENDA) 5 MG tablet Take 1 tablet by mouth 2 times daily 270 tablet 1    escitalopram (LEXAPRO) 10 MG tablet TAKE ONE TABLET BY MOUTH DAILY 90 tablet 3    busPIRone (BUSPAR) 5 MG tablet Take 1 tablet by mouth 3 times daily (Patient taking differently: Take 0.5 tablets by mouth as needed) 270 tablet 0    ELIQUIS 5 MG TABS tablet TAKE ONE TABLET BY MOUTH TWICE A  tablet 3    carvedilol (COREG) 6.25 MG tablet Take 0.5 tablets by mouth 2 times daily 180 tablet 1
21-Jun-2023

## 2023-11-17 ENCOUNTER — EMERGENCY (EMERGENCY)
Facility: HOSPITAL | Age: 33
LOS: 1 days | Discharge: HOME | End: 2023-11-17
Attending: EMERGENCY MEDICINE | Admitting: EMERGENCY MEDICINE
Payer: COMMERCIAL

## 2023-11-17 VITALS
DIASTOLIC BLOOD PRESSURE: 77 MMHG | OXYGEN SATURATION: 100 % | RESPIRATION RATE: 20 BRPM | HEART RATE: 68 BPM | SYSTOLIC BLOOD PRESSURE: 133 MMHG | TEMPERATURE: 98 F

## 2023-11-17 VITALS
OXYGEN SATURATION: 99 % | TEMPERATURE: 98 F | RESPIRATION RATE: 20 BRPM | DIASTOLIC BLOOD PRESSURE: 78 MMHG | SYSTOLIC BLOOD PRESSURE: 128 MMHG | HEART RATE: 100 BPM

## 2023-11-17 LAB
ALBUMIN SERPL ELPH-MCNC: 4.2 G/DL — SIGNIFICANT CHANGE UP (ref 3.3–5)
ALP SERPL-CCNC: 43 U/L — SIGNIFICANT CHANGE UP (ref 40–120)
ALT FLD-CCNC: 14 U/L — SIGNIFICANT CHANGE UP (ref 10–45)
AMPHET UR-MCNC: NEGATIVE — SIGNIFICANT CHANGE UP
ANION GAP SERPL CALC-SCNC: 8 MMOL/L — SIGNIFICANT CHANGE UP (ref 5–17)
APAP SERPL-MCNC: <5 UG/ML — LOW (ref 10–30)
APPEARANCE UR: CLEAR — SIGNIFICANT CHANGE UP
AST SERPL-CCNC: 19 U/L — SIGNIFICANT CHANGE UP (ref 10–40)
BARBITURATES UR SCN-MCNC: NEGATIVE — SIGNIFICANT CHANGE UP
BASOPHILS # BLD AUTO: 0.03 K/UL — SIGNIFICANT CHANGE UP (ref 0–0.2)
BASOPHILS NFR BLD AUTO: 0.5 % — SIGNIFICANT CHANGE UP (ref 0–2)
BENZODIAZ UR-MCNC: POSITIVE
BILIRUB SERPL-MCNC: 0.3 MG/DL — SIGNIFICANT CHANGE UP (ref 0.2–1.2)
BILIRUB UR-MCNC: NEGATIVE — SIGNIFICANT CHANGE UP
BUN SERPL-MCNC: 11 MG/DL — SIGNIFICANT CHANGE UP (ref 7–23)
CALCIUM SERPL-MCNC: 9.4 MG/DL — SIGNIFICANT CHANGE UP (ref 8.4–10.5)
CHLORIDE SERPL-SCNC: 102 MMOL/L — SIGNIFICANT CHANGE UP (ref 96–108)
CO2 SERPL-SCNC: 26 MMOL/L — SIGNIFICANT CHANGE UP (ref 22–31)
COCAINE METAB.OTHER UR-MCNC: NEGATIVE — SIGNIFICANT CHANGE UP
COLOR SPEC: YELLOW — SIGNIFICANT CHANGE UP
CREAT SERPL-MCNC: 0.71 MG/DL — SIGNIFICANT CHANGE UP (ref 0.5–1.3)
DIFF PNL FLD: NEGATIVE — SIGNIFICANT CHANGE UP
EGFR: 114 ML/MIN/1.73M2 — SIGNIFICANT CHANGE UP
EOSINOPHIL # BLD AUTO: 0.21 K/UL — SIGNIFICANT CHANGE UP (ref 0–0.5)
EOSINOPHIL NFR BLD AUTO: 3.6 % — SIGNIFICANT CHANGE UP (ref 0–6)
ETHANOL SERPL-MCNC: <10 MG/DL — SIGNIFICANT CHANGE UP (ref 0–10)
GLUCOSE SERPL-MCNC: 98 MG/DL — SIGNIFICANT CHANGE UP (ref 70–99)
GLUCOSE UR QL: NEGATIVE MG/DL — SIGNIFICANT CHANGE UP
HCT VFR BLD CALC: 38.3 % — SIGNIFICANT CHANGE UP (ref 34.5–45)
HGB BLD-MCNC: 12.7 G/DL — SIGNIFICANT CHANGE UP (ref 11.5–15.5)
IMM GRANULOCYTES NFR BLD AUTO: 0.2 % — SIGNIFICANT CHANGE UP (ref 0–0.9)
KETONES UR-MCNC: NEGATIVE MG/DL — SIGNIFICANT CHANGE UP
LEUKOCYTE ESTERASE UR-ACNC: NEGATIVE — SIGNIFICANT CHANGE UP
LYMPHOCYTES # BLD AUTO: 2.28 K/UL — SIGNIFICANT CHANGE UP (ref 1–3.3)
LYMPHOCYTES # BLD AUTO: 39.2 % — SIGNIFICANT CHANGE UP (ref 13–44)
MCHC RBC-ENTMCNC: 29.7 PG — SIGNIFICANT CHANGE UP (ref 27–34)
MCHC RBC-ENTMCNC: 33.2 GM/DL — SIGNIFICANT CHANGE UP (ref 32–36)
MCV RBC AUTO: 89.7 FL — SIGNIFICANT CHANGE UP (ref 80–100)
METHADONE UR-MCNC: NEGATIVE — SIGNIFICANT CHANGE UP
MONOCYTES # BLD AUTO: 0.34 K/UL — SIGNIFICANT CHANGE UP (ref 0–0.9)
MONOCYTES NFR BLD AUTO: 5.9 % — SIGNIFICANT CHANGE UP (ref 2–14)
NEUTROPHILS # BLD AUTO: 2.94 K/UL — SIGNIFICANT CHANGE UP (ref 1.8–7.4)
NEUTROPHILS NFR BLD AUTO: 50.6 % — SIGNIFICANT CHANGE UP (ref 43–77)
NITRITE UR-MCNC: NEGATIVE — SIGNIFICANT CHANGE UP
NRBC # BLD: 0 /100 WBCS — SIGNIFICANT CHANGE UP (ref 0–0)
OPIATES UR-MCNC: POSITIVE
PCP SPEC-MCNC: SIGNIFICANT CHANGE UP
PCP UR-MCNC: NEGATIVE — SIGNIFICANT CHANGE UP
PH UR: 7.5 — SIGNIFICANT CHANGE UP (ref 5–8)
PLATELET # BLD AUTO: 88 K/UL — LOW (ref 150–400)
POTASSIUM SERPL-MCNC: 4.3 MMOL/L — SIGNIFICANT CHANGE UP (ref 3.5–5.3)
POTASSIUM SERPL-SCNC: 4.3 MMOL/L — SIGNIFICANT CHANGE UP (ref 3.5–5.3)
PROT SERPL-MCNC: 6.7 G/DL — SIGNIFICANT CHANGE UP (ref 6–8.3)
PROT UR-MCNC: NEGATIVE MG/DL — SIGNIFICANT CHANGE UP
RBC # BLD: 4.27 M/UL — SIGNIFICANT CHANGE UP (ref 3.8–5.2)
RBC # FLD: 12.3 % — SIGNIFICANT CHANGE UP (ref 10.3–14.5)
SALICYLATES SERPL-MCNC: <0.3 MG/DL — LOW (ref 2.8–20)
SARS-COV-2 RNA SPEC QL NAA+PROBE: NEGATIVE — SIGNIFICANT CHANGE UP
SODIUM SERPL-SCNC: 136 MMOL/L — SIGNIFICANT CHANGE UP (ref 135–145)
SP GR SPEC: 1.02 — SIGNIFICANT CHANGE UP (ref 1–1.03)
THC UR QL: NEGATIVE — SIGNIFICANT CHANGE UP
UROBILINOGEN FLD QL: 1 MG/DL — SIGNIFICANT CHANGE UP (ref 0.2–1)
WBC # BLD: 5.81 K/UL — SIGNIFICANT CHANGE UP (ref 3.8–10.5)
WBC # FLD AUTO: 5.81 K/UL — SIGNIFICANT CHANGE UP (ref 3.8–10.5)

## 2023-11-17 PROCEDURE — 81003 URINALYSIS AUTO W/O SCOPE: CPT

## 2023-11-17 PROCEDURE — 99284 EMERGENCY DEPT VISIT MOD MDM: CPT | Mod: 25

## 2023-11-17 PROCEDURE — 36415 COLL VENOUS BLD VENIPUNCTURE: CPT

## 2023-11-17 PROCEDURE — 80053 COMPREHEN METABOLIC PANEL: CPT

## 2023-11-17 PROCEDURE — 90792 PSYCH DIAG EVAL W/MED SRVCS: CPT

## 2023-11-17 PROCEDURE — 80307 DRUG TEST PRSMV CHEM ANLYZR: CPT

## 2023-11-17 PROCEDURE — 87635 SARS-COV-2 COVID-19 AMP PRB: CPT

## 2023-11-17 PROCEDURE — 93005 ELECTROCARDIOGRAM TRACING: CPT

## 2023-11-17 PROCEDURE — 93010 ELECTROCARDIOGRAM REPORT: CPT

## 2023-11-17 PROCEDURE — 85025 COMPLETE CBC W/AUTO DIFF WBC: CPT

## 2023-11-17 PROCEDURE — 99285 EMERGENCY DEPT VISIT HI MDM: CPT

## 2023-11-17 NOTE — ED ADULT NURSE NOTE - NSFALLUNIVINTERV_ED_ALL_ED
Bed/Stretcher in lowest position, wheels locked, appropriate side rails in place/Call bell, personal items and telephone in reach/Instruct patient to call for assistance before getting out of bed/chair/stretcher/Non-slip footwear applied when patient is off stretcher/[place] to call system/Physically safe environment - no spills, clutter or unnecessary equipment/Purposeful proactive rounding/Room/bathroom lighting operational, light cord in reach Bed/Stretcher in lowest position, wheels locked, appropriate side rails in place/Call bell, personal items and telephone in reach/Instruct patient to call for assistance before getting out of bed/chair/stretcher/Non-slip footwear applied when patient is off stretcher/[place] to call system/Physically safe environment - no spills, clutter or unnecessary equipment/Purposeful proactive rounding/Room/bathroom lighting operational, light cord in reach Bed/Stretcher in lowest position, wheels locked, appropriate side rails in place/Call bell, personal items and telephone in reach/Instruct patient to call for assistance before getting out of bed/chair/stretcher/Non-slip footwear applied when patient is off stretcher/[place] to call system/Physically safe environment - no spills, clutter or unnecessary equipment/Purposeful proactive rounding/Room/bathroom lighting operational, light cord in reach

## 2023-11-17 NOTE — ED BEHAVIORAL HEALTH ASSESSMENT NOTE - DESCRIPTION
Patient BIB family who departed. She is waiting for phone call from brother. none Patient adopted The patient was born and raised in [place].  She is adopted.  Currently domiciled with a family friend.  She is employed as an HHA and receives food stamps.  She identifies as Rastafarian. Per ED Provider Note written by Dr. Alisson Amin on 23:  "· Chief Complaint: The patient is a 33y Female complaining of psychiatric evaluation.  · HPI Objective Statement: 34 yo F reporting hearing voices x 3-4 days with suicidal ideation.  States symptoms triggered by her aunt being in a coma from a car accident as well as the upcoming holidays.  Patient's sister  and she has other stressors from childhood.  States that around the holidays she feels guilty for not dying instead of her sister.  The voices tell her that she should have  instead of her sister.  No plan for self-harm.  Not sure of her meds.  No cough, cold, cp, sob, abd pain, fever, nvd or other symptoms" The patient was born and raised in [place].  She is adopted.  Currently domiciled with a family friend.  She is employed as an HHA and receives food stamps.  She identifies as Shinto. The patient was born and raised in [place].  She is adopted.  Currently domiciled with a family friend.  She is employed as an HHA and receives food stamps.  She identifies as Gnosticist.

## 2023-11-17 NOTE — ED BEHAVIORAL HEALTH ASSESSMENT NOTE - CURRENT MEDICATION
Sertraline HCl 50 mg PO daily (05/08/23 – 09/26/23), Clonidine HCl 0.1 mg PO BID (05/28/23 – 09/26/23), Sertraline HCl 50 mg PO daily (05/08/23 – 09/26/23), Clonidine HCl 0.1 mg PO BID (05/28/23 – 09/26/23), alprazolam 1 mg PO BID

## 2023-11-17 NOTE — ED BEHAVIORAL HEALTH ASSESSMENT NOTE - NSBHMSEPERCEPT_PSY_A_CORE
Reports she could see and smell uncle today/Other The patient describes hearing her voice telling her that she is the one who should have  instead of her sister./Auditory hallucinations
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
## 2023-11-17 NOTE — ED PROVIDER NOTE - NS_EDPROVIDERDISPOUSERTYPE_ED_A_ED
[FreeTextEntry1] : Check labs and continue meds\par Return 3 months\par  Attending Attestation (For Attendings USE Only)...

## 2023-11-17 NOTE — ED BEHAVIORAL HEALTH ASSESSMENT NOTE - RISK ASSESSMENT
The patient has numerous chronic risk factors for harm to self and others including a remote history of suicide attempts and self-harm, ongoing substance use, poor adherence to medications, and significant trauma history.   However, the patient is organized, help-seeking, forward-thinking, has the capacity to advocate for self, has good social support, is domiciled, employed, has Mandaen beliefs against suicide/violence, and is connected to outpatient psychiatry and psychotherapy.   The patient's chronic risk factors are unlikely to be modified by an inpatient psychiatric admission.  The patient is not at an increased risk from baseline at this time, does not represent an imminent danger to self/others, and does not meet criteria for psychiatric hospitalization or further observation.  The patient is psychiatrically cleared for discharge to [facility] with outpatient follow-up. The patient has numerous chronic risk factors for harm to self and others including a remote history of suicide attempts and self-harm, ongoing substance use, poor adherence to medications, and significant trauma history.   However, the patient is organized, help-seeking, forward-thinking, has the capacity to advocate for self, has good social support, is domiciled, employed, has Mormon beliefs against suicide/violence, and is connected to outpatient psychiatry and psychotherapy.   The patient's chronic risk factors are unlikely to be modified by an inpatient psychiatric admission.  The patient is not at an increased risk from baseline at this time, does not represent an imminent danger to self/others, and does not meet criteria for psychiatric hospitalization or further observation.  The patient is psychiatrically cleared for discharge to [facility] with outpatient follow-up. The patient has numerous chronic risk factors for harm to self and others including a remote history of suicide attempts and self-harm, ongoing substance use, poor adherence to medications, and significant trauma history.   However, the patient is organized, help-seeking, forward-thinking, has the capacity to advocate for self, has good social support, is domiciled, employed, has Samaritan beliefs against suicide/violence, and is connected to outpatient psychiatry and psychotherapy.   The patient's chronic risk factors are unlikely to be modified by an inpatient psychiatric admission.  The patient is not at an increased risk from baseline at this time, does not represent an imminent danger to self/others, and does not meet criteria for psychiatric hospitalization or further observation.  The patient is psychiatrically cleared for discharge to [facility] with outpatient follow-up.

## 2023-11-17 NOTE — ED BEHAVIORAL HEALTH ASSESSMENT NOTE - PAST PSYCHOTROPIC MEDICATION
Sertraline HCl 50 mg PO daily (05/08/23 – 09/26/23), Clonidine HCl 0.1 mg PO BID (05/28/23 – 09/26/23), Bupropion HCl  mg PO daily (12/21/22), Escitalopram Oxalate 10 mg PO daily (12/19/22), Gabapentin 300 mg Po daily (08/22/22), Alprazolam 1 mg PO TID (02/06/23 – 10/24/23), Methadone Maintenance (09/26/22 – 10/02/23), Amphetamine-Dextroamphetamine 5 mg Po daily (09/01/21 – 12/23/21), Haloperidol

## 2023-11-17 NOTE — ED PROVIDER NOTE - PHYSICAL EXAMINATION
General:  Well appearing, no distress  HEENT:  No conjunctival injection, neck supple, no congestion   Chest:  Non-tender, no crepitance  Lungs:  Clear to auscultation bilaterally   Heart:  s1s2 normal, no murmur  Abdomen:  soft, non-tender, non-distended  :  Deferred  Rectal:  Deferred  Extremities: No edema, normal perfusion, no joint swelling or tenderness  Neuro:  Alert, conversant, motor/sensory grossly intact   Psychiatry:  Calm, cooperative, +suicidal ideation -homicidal ideation +hallucinations
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
## 2023-11-17 NOTE — ED ADULT NURSE REASSESSMENT NOTE - NS ED NURSE REASSESS COMMENT FT1
Received report from mid shift RN. Patient remains on constant observation. All belongings remain secured. ED tech at bedside. All ligature risks removed. All safety precautions maintained. Pending D/c

## 2023-11-17 NOTE — ED BEHAVIORAL HEALTH ASSESSMENT NOTE - SAFETY PLAN ADDT'L DETAILS
Education provided regarding environmental safety / lethal means restriction/Provision of National Suicide Prevention Lifeline 8-163-289-TALK (9160) Education provided regarding environmental safety / lethal means restriction/Provision of National Suicide Prevention Lifeline 8-576-272-TALK (1028) Education provided regarding environmental safety / lethal means restriction/Provision of National Suicide Prevention Lifeline 4-389-507-TALK (8459)

## 2023-11-17 NOTE — ED BEHAVIORAL HEALTH ASSESSMENT NOTE - NS ED BHA PLAN TR BH CONTACTED FT
[facility] ([phone]) [facility] ([phone]) [facility] ([phone])

## 2023-11-17 NOTE — ED BEHAVIORAL HEALTH ASSESSMENT NOTE - SUMMARY
33 year old Bilingual [place] female, single, domiciled with a family friend, employed as a HHA with PMH and PPH PTSD, ADHD, MAY, Polysubstance Use Disorder (Tobacco, Cannabis, Opioid, Cocaine, Sedative/Hypnotic, Alcohol), SIMD, history of multiple inpatient psychiatric hospitalizations most recently at [facility] (06/18/23 – 06/23/23) for PTSD, [facility] (09/19/22 - 09/23/22) for Bipolar Disorder Unspecified, history of multiple inpatient admissions for ORNELAS detox/rehab most recently at [facility] (07/09/22 – 07/13/22) for Alcohol Dependence with withdrawal uncomplicated, remote history of suicide attempts, history of NSSIB (e.g. superficial cutting of forearms), no history of violence, significant history of trauma BIB self to [facility] ED reporting AH and passive SI without intent or plan for the past three days in the setting of the upcoming anniversary of her sister's death and the holidays.      On evaluation, the patient is calm, cooperative, pleasant, well-related with good eye contact and demonstrating good behavioral control and linear thought processes.  The patient does not appear internally preoccupied during the interview. 33 year old Bilingual [place] female, single, domiciled with a family friend, employed as a HHA with PMH and PPH PTSD, ADHD, MAY, Polysubstance Use Disorder (Tobacco, Cannabis, Opioid, Cocaine, Sedative/Hypnotic, Alcohol), SIMD, history of multiple inpatient psychiatric hospitalizations most recently at [facility] (06/18/23 – 06/23/23) for PTSD, [facility] (09/19/22 - 09/23/22) for Bipolar Disorder Unspecified, history of multiple inpatient admissions for ORNELAS detox/rehab most recently at [facility] (07/09/22 – 07/13/22) for Alcohol Dependence with withdrawal uncomplicated, remote history of suicide attempts, history of NSSIB (e.g. superficial cutting of forearms), no history of violence, significant history of trauma BIB self to [facility] ED reporting AH and passive SI without intent or plan for the past three days in the setting of the upcoming anniversary of her sister's death and the holidays.      On evaluation, the patient is calm, cooperative, pleasant, well-related with good eye contact and demonstrating good behavioral control and linear thought processes.  The patient does not appear internally preoccupied during the interview. 33 year old Bilingual [place] female, single, domiciled with a family friend, employed as a HHA with PMH and PPH PTSD, ADHD, MAY, Polysubstance Use Disorder (Tobacco, Cannabis, Opioid, Cocaine, Sedative/Hypnotic, Alcohol), SIMD, history of multiple inpatient psychiatric hospitalizations most recently at [facility] (06/18/23 – 06/23/23) for PTSD, [facility] (09/19/22 - 09/23/22) for Bipolar Disorder Unspecified, history of multiple inpatient admissions for ORNELAS detox/rehab most recently at [facility] (07/09/22 – 07/13/22) for Alcohol Dependence with withdrawal uncomplicated, remote history of suicide attempts, history of NSSIB (e.g. superficial cutting of forearms), no history of violence, significant history of trauma BIB self to [facility] ED reporting AH and passive SI without intent or plan for the past three days in the setting of the upcoming anniversary of her sister's death and the holidays.      On evaluation, the patient is calm, cooperative, pleasant, well-related with good eye contact and demonstrating good behavioral control and linear thought processes.  The patient does not appear internally preoccupied during the interview. 33 year old Bilingual [place] female, single, domiciled with a family friend, employed as a HHA with PMH and PPH PTSD, ADHD, MAY, Polysubstance Use Disorder (Tobacco, Cannabis, Opioid, Cocaine, Sedative/Hypnotic, Alcohol), SIMD, history of multiple inpatient psychiatric hospitalizations most recently at [facility] (23 – 23) for PTSD, [facility] (22 - 22) for Bipolar Disorder Unspecified, history of multiple inpatient admissions for ORNELAS detox/rehab most recently at [facility] (22 – 22) for Alcohol Dependence with withdrawal uncomplicated, remote history of suicide attempts, history of NSSIB (e.g. superficial cutting of forearms), no history of violence, significant history of trauma BIB self to [facility] ED reporting AH and passive SI without intent or plan for the past three days in the setting of the upcoming anniversary of her sister's death and the holidays.      On evaluation, the patient is calm, cooperative, pleasant, well-related with good eye contact and demonstrating good behavioral control and linear thought processes.  The patient does not appear internally preoccupied during the interview.  The patient reports worsening auditory hallucinations (e.g. her voice telling her she is the one who should have  and not her sister), insomnia, intermittent passive SI without intent or plan and nightmares in the setting of substance use, poor medication adherence and recent psychosocial stressor (e.g. argument with roommate) and upcoming anniversary of her sister's death.  It is unclear if the patient is experiencing true auditory hallucinations given no evidence of internal preoccupation and a description of the AH as "her voice."  The patient's presentation of insomnia, trauma-related nightmares, and acute worsening of anxiety is likely secondary to PTSD exacerbated by the upcoming anniversary of her sister's death, substance use and poor medication adherence.  However, a primary psychotic disorder such as Schizoaffective Disorder should be ruled out.  The patient adamantly denies current SI/HI, intent and plan.  The patient has outpatient psychotherapeutic follow-up at [facility] on Monday (23) as well as outpatient psychiatric follow-up at [facility] the first week of December.  The patient agrees to reach out to her outpatient psychiatrist next week to schedule an earlier appointment and discuss her acute worsening of symptoms.  The patient has numerous chronic risk factors for harm to self and others including a remote history of suicide attempts and self-harm, ongoing substance use, poor adherence to medications, and significant trauma history.   However, the patient is organized, help-seeking, forward-thinking, has the capacity to advocate for self, has good social support, is domiciled, employed, has Quaker beliefs against suicide/violence, and is connected to outpatient psychiatry and psychotherapy.   The patient's chronic risk factors are unlikely to be modified by an inpatient psychiatric admission.  The patient is not at an increased risk from baseline at this time, does not represent an imminent danger to self/others, and does not meet criteria for psychiatric hospitalization or further observation.  The patient is psychiatrically cleared for discharge to [facility] with outpatient follow-up.    Plan:  - The patient is psychiatrically cleared for discharge to [facility].  The patient was provided an opportunity to complete a safety plan prior to discharge but she declined.    - Follow-up for psychotherapy at [facility] on 23  - Patient agreed to contact outpatient psychiatrist to obtain an appointment earlier than her scheduled follow-up the first week of December.  A message was left at [place] [employer] [facility] ([phone]) to inform the patient's psychiatrist of the patient's ED presentation today.  - Continue outpatient psychiatric medications including Sertraline HCl 50 mg PO daily and Clonidine HCl 0.1 mg PO BID  - The patient was also provided with crisis support and referral information including [address] [place] [address] and instructed to call 911 or return to the nearest emergency room if the symptoms worsen.  - The patient has adequate access to food, medication and safe shelter and is cleared for discharge with outpatient follow-up. 33 year old Bilingual [place] female, single, domiciled with a family friend, employed as a HHA with PMH and PPH PTSD, ADHD, MAY, Polysubstance Use Disorder (Tobacco, Cannabis, Opioid, Cocaine, Sedative/Hypnotic, Alcohol), SIMD, history of multiple inpatient psychiatric hospitalizations most recently at [facility] (23 – 23) for PTSD, [facility] (22 - 22) for Bipolar Disorder Unspecified, history of multiple inpatient admissions for ORNELAS detox/rehab most recently at [facility] (22 – 22) for Alcohol Dependence with withdrawal uncomplicated, remote history of suicide attempts, history of NSSIB (e.g. superficial cutting of forearms), no history of violence, significant history of trauma BIB self to [facility] ED reporting AH and passive SI without intent or plan for the past three days in the setting of the upcoming anniversary of her sister's death and the holidays.      On evaluation, the patient is calm, cooperative, pleasant, well-related with good eye contact and demonstrating good behavioral control and linear thought processes.  The patient does not appear internally preoccupied during the interview.  The patient reports worsening auditory hallucinations (e.g. her voice telling her she is the one who should have  and not her sister), insomnia, intermittent passive SI without intent or plan and nightmares in the setting of substance use, poor medication adherence and recent psychosocial stressor (e.g. argument with roommate) and upcoming anniversary of her sister's death.  It is unclear if the patient is experiencing true auditory hallucinations given no evidence of internal preoccupation and a description of the AH as "her voice."  The patient's presentation of insomnia, trauma-related nightmares, and acute worsening of anxiety is likely secondary to PTSD exacerbated by the upcoming anniversary of her sister's death, substance use and poor medication adherence.  However, a primary psychotic disorder such as Schizoaffective Disorder should be ruled out.  The patient adamantly denies current SI/HI, intent and plan.  The patient has outpatient psychotherapeutic follow-up at [facility] on Monday (23) as well as outpatient psychiatric follow-up at [facility] the first week of December.  The patient agrees to reach out to her outpatient psychiatrist next week to schedule an earlier appointment and discuss her acute worsening of symptoms.  The patient has numerous chronic risk factors for harm to self and others including a remote history of suicide attempts and self-harm, ongoing substance use, poor adherence to medications, and significant trauma history.   However, the patient is organized, help-seeking, forward-thinking, has the capacity to advocate for self, has good social support, is domiciled, employed, has Baptist beliefs against suicide/violence, and is connected to outpatient psychiatry and psychotherapy.   The patient's chronic risk factors are unlikely to be modified by an inpatient psychiatric admission.  The patient is not at an increased risk from baseline at this time, does not represent an imminent danger to self/others, and does not meet criteria for psychiatric hospitalization or further observation.  The patient is psychiatrically cleared for discharge to [facility] with outpatient follow-up.    Plan:  - The patient is psychiatrically cleared for discharge to [facility].  The patient was provided an opportunity to complete a safety plan prior to discharge but she declined.    - Follow-up for psychotherapy at [facility] on 23  - Patient agreed to contact outpatient psychiatrist to obtain an appointment earlier than her scheduled follow-up the first week of December.  A message was left at [place] [employer] [facility] ([phone]) to inform the patient's psychiatrist of the patient's ED presentation today.  - Continue outpatient psychiatric medications including Sertraline HCl 50 mg PO daily and Clonidine HCl 0.1 mg PO BID  - The patient was also provided with crisis support and referral information including [address] [place] [address] and instructed to call 911 or return to the nearest emergency room if the symptoms worsen.  - The patient has adequate access to food, medication and safe shelter and is cleared for discharge with outpatient follow-up. 33 year old Bilingual [place] female, single, domiciled with a family friend, employed as a HHA with PMH and PPH PTSD, ADHD, MAY, Polysubstance Use Disorder (Tobacco, Cannabis, Opioid, Cocaine, Sedative/Hypnotic, Alcohol), SIMD, history of multiple inpatient psychiatric hospitalizations most recently at [facility] (23 – 23) for PTSD, [facility] (22 - 22) for Bipolar Disorder Unspecified, history of multiple inpatient admissions for ORNELAS detox/rehab most recently at [facility] (22 – 22) for Alcohol Dependence with withdrawal uncomplicated, remote history of suicide attempts, history of NSSIB (e.g. superficial cutting of forearms), no history of violence, significant history of trauma BIB self to [facility] ED reporting AH and passive SI without intent or plan for the past three days in the setting of the upcoming anniversary of her sister's death and the holidays.      On evaluation, the patient is calm, cooperative, pleasant, well-related with good eye contact and demonstrating good behavioral control and linear thought processes.  The patient does not appear internally preoccupied during the interview.  The patient reports worsening auditory hallucinations (e.g. her voice telling her she is the one who should have  and not her sister), insomnia, intermittent passive SI without intent or plan and nightmares in the setting of substance use, poor medication adherence and recent psychosocial stressor (e.g. argument with roommate) and upcoming anniversary of her sister's death.  It is unclear if the patient is experiencing true auditory hallucinations given no evidence of internal preoccupation and a description of the AH as "her voice."  The patient's presentation of insomnia, trauma-related nightmares, and acute worsening of anxiety is likely secondary to PTSD exacerbated by the upcoming anniversary of her sister's death, substance use and poor medication adherence.  However, a primary psychotic disorder such as Schizoaffective Disorder should be ruled out.  The patient adamantly denies current SI/HI, intent and plan.  The patient has outpatient psychotherapeutic follow-up at [facility] on Monday (23) as well as outpatient psychiatric follow-up at [facility] the first week of December.  The patient agrees to reach out to her outpatient psychiatrist next week to schedule an earlier appointment and discuss her acute worsening of symptoms.  The patient has numerous chronic risk factors for harm to self and others including a remote history of suicide attempts and self-harm, ongoing substance use, poor adherence to medications, and significant trauma history.   However, the patient is organized, help-seeking, forward-thinking, has the capacity to advocate for self, has good social support, is domiciled, employed, has Faith beliefs against suicide/violence, and is connected to outpatient psychiatry and psychotherapy.   The patient's chronic risk factors are unlikely to be modified by an inpatient psychiatric admission.  The patient is not at an increased risk from baseline at this time, does not represent an imminent danger to self/others, and does not meet criteria for psychiatric hospitalization or further observation.  The patient is psychiatrically cleared for discharge to [facility] with outpatient follow-up.    Plan:  - The patient is psychiatrically cleared for discharge to [facility].  The patient was provided an opportunity to complete a safety plan prior to discharge but she declined.    - Follow-up for psychotherapy at [facility] on 23  - Patient agreed to contact outpatient psychiatrist to obtain an appointment earlier than her scheduled follow-up the first week of December.  A message was left at [place] [facility] ([phone]) to inform the patient's psychiatrist of the patient's ED presentation today.  - Continue outpatient psychiatric medications including Sertraline HCl 50 mg PO daily and Clonidine HCl 0.1 mg PO BID  - The patient was also provided with crisis support and referral information including [address] [place] [address] and instructed to call 911 or return to the nearest emergency room if the symptoms worsen.  - The patient has adequate access to food, medication and safe shelter and is cleared for discharge with outpatient follow-up.
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
## 2023-11-17 NOTE — ED PROVIDER NOTE - OBJECTIVE STATEMENT
34 yo F reporting hearing voices x 3-4 days with suicidal ideation  States symptoms triggered by her aunt being in a coma from a car accident as well as the upcoming holidays  Patient's sister  and she has other stressors from childhood.  States that around the holidays she feels guilty for not dying instead of her sister  The voices tell her that she should have  instead of her sister  No plan for self-harm  Not sure of her meds  No cough, cold, cp, sob, abd pain, fever, nvd or other symptoms 32 yo F reporting hearing voices x 3-4 days with suicidal ideation  States symptoms triggered by her aunt being in a coma from a car accident as well as the upcoming holidays  Patient's sister  and she has other stressors from childhood.  States that around the holidays she feels guilty for not dying instead of her sister  The voices tell her that she should have  instead of her sister  No plan for self-harm  Not sure of her meds  No cough, cold, cp, sob, abd pain, fever, nvd or other symptoms

## 2023-11-17 NOTE — ED PROVIDER NOTE - CLINICAL SUMMARY MEDICAL DECISION MAKING FREE TEXT BOX
36 yo F PMH anxiety and prior hallucinations/psychiatric admissions (not sure of last admit or meds) reporting recurrence of hallucinations and suicidal ideation x 3-4 days.  Screening labs obtained.  Will d/w Psychiatry to assess. 38 yo F PMH anxiety and prior hallucinations/psychiatric admissions (not sure of last admit or meds) reporting recurrence of hallucinations and suicidal ideation x 3-4 days.  Screening labs obtained.  Will d/w Psychiatry to assess. 32 yo F PMH anxiety and prior hallucinations/psychiatric admissions (not sure of last admit or meds) reporting recurrence of hallucinations and suicidal ideation x 3-4 days.  Screening labs obtained.  Will d/w Psychiatry to assess.    20:30:  Case d/w Psychiatry who will assess. 34 yo F PMH anxiety and prior hallucinations/psychiatric admissions (not sure of last admit or meds) reporting recurrence of hallucinations and suicidal ideation x 3-4 days.  Screening labs obtained.  Will d/w Psychiatry to assess.    20:30:  Case d/w Psychiatry who will assess.

## 2023-11-17 NOTE — ED ADULT TRIAGE NOTE - NS_BH TRG QUESTION7_ED_ALL_ED
Depression (without Suicidality or Psychosis)/Moon (includes Bipolar Disorder)/Anxiety (includes Panic, OCD)

## 2023-11-17 NOTE — ED PROVIDER NOTE - NSFOLLOWUPINSTRUCTIONS_ED_ALL_ED_FT
TAKE ALL OF YOUR USUAL MEDICATIONS    CALL YOUR PSYCHIATRIST MONDAY TO DISCUSS YOUR EMERGENCY ROOM VISIT    Suicidal Feelings: How to Help Yourself  Suicide is when you end your own life. Suicidal ideation includes expressing thoughts about, or a preoccupation with, ending your own life. There are many things you can do to help yourself feel better when struggling with these feelings. Many services and people are available to support you and others who struggle with similar feelings.    If you ever feel like you may hurt yourself or others, or have thoughts about taking your own life, get help right away. To get help:  Go to your nearest emergency department.  Call your local emergency services (911 in the [place]  Call the [facility] and human services helpline (949 in the [place]  Call or text a suicide hotline to speak with a trained counselor. The following suicide hotlines are available in the [place]:  0-168-382-TALK (1-851.589.3375 or 214 in the [place]  0-565-SGBMFFM (1-307.106.1872).  Text 708956. This is the Crisis Text Line in the [place]  1-514.151.5623. This is a hotline for Marshall[place] speakers.  1-418.166.9565. This is a hotline for TTY users.  7-667-5-U-JASBIR (1-185.877.7992). This is a hotline for lesbian, maria, bisexual, transgender, or questioning youth.  For a list of hotlines in [place], visit suicide.org/hotlines/international/zzxrai-fjpisbj-grhehouh.html  Contact a crisis center or a local suicide prevention center. To find a crisis center or suicide prevention center:  Call your local hospital, clinic, community service organization, mental health center, social service provider, or health department. Ask for help with connecting to a crisis center.  For a list of crisis centers in the [place], visit: suicidepreventionlifeline.org  For a list of crisis centers in [place], visit: suicideprevention.ca  How to help yourself feel better  A teen talking with an adult.  Promise yourself that you will not do anything bad or extreme when you have suicidal feelings. Remember the times you have felt hopeful.  Many people have gotten through suicidal thoughts and feelings, and you can too.  If you have had these feelings before, remind yourself that you can get through them again.  Let family, friends, teachers, or counselors know how you are feeling. Do not separate yourself from those who care about you and want to help you.  Talk with someone every day, even if you do not feel like talking to anyone or being with other people.  Face-to-face conversation is best to help them understand your feelings.  Contact a mental health care provider and work with this person regularly.  Make a safety plan that you can follow during a crisis.  Include phone numbers of suicide prevention hotlines, mental health professionals, and trusted friends and family members you can call during an emergency.  Save these numbers on your phone.  If you are thinking of taking a lot of medicine, give your medicine to someone who can give it to you as prescribed.  If you are on antidepressants and are concerned you will overdose, tell your health care provider so that he or she can give you safer medicines.  Try to stick to your routines and follow a schedule every day. Make self-care a priority.  Make a list of realistic goals, and cross them off when you achieve them. Accomplishments can give you a sense of worth.  Wait until you are feeling better before doing things that you find difficult or unpleasant.  Do things that you have always enjoyed to take your mind off your feelings.  Try reading a book, or listening to or playing music.  Spending time outside, in nature, may help you feel better.  Follow these instructions at home:  A sign asking the reader not to drink beer, wine, or hard liquor.   Visit your primary health care provider every year for a physical and a mental health checkup.  Take over-the-counter and prescription medicines only as told by your health care provider.  Ask your health care provider about the possible side effects of any medicines you are taking.  Ask your health care provider about whether suicidal ideation is a possible side effect of any of your medicines.  Learn about suicidal ideation and what increases the risk for the development of suicidal thoughts.  Eat a well-balanced diet, and eat regular meals.  Get plenty of rest.  Exercise if you are able. Just 30 minutes of exercise each day can help you feel better.  Keep your living space well lit.  Do not use alcohol or drugs. Remove these substances from your home.  General recommendations  Remove weapons, poisons, knives, and other deadly items from your home.  Work with a mental health care provider as needed.  When you are feeling well, write yourself a letter with tips and support that you can read when you are not feeling well.  Remember that life's difficulties can be sorted out with help. Conditions can be treated, and you can learn behaviors and ways of thinking that will help you.  Work with your health care provider or counselor to learn ways of coping with your thoughts and feelings.  Where to find more information  National Suicide Prevention Lifeline: www.suicidepreventionlifeline.org  Hopeline: www.hopeline.[place]  American Foundation for Suicide Prevention: www.afsp.org  The Jasbir Project (for lesbian, maria, bisexual, transgender, or questioning youth): www.thetrevorproject.org  National [place] of Mental Health: www.nimh.nih.gov/health/topics/suicide-prevention  Suicide Prevention Resources: afsp.org/suicide-prevention-resources  Contact a health care provider if:  You feel as though you are a burden to others.  You feel agitated, angry, vengeful, or have extreme mood swings.  You have withdrawn from family and friends.  You are frequently using drugs or alcohol.  Get help right away if:  You are talking about suicide or wishing to die.  You start making plans for how to commit suicide.  You feel that you have no reason to live.  You start making plans for putting your affairs in order, saying goodbye, or giving your possessions away.  You feel guilt, shame, or unbearable pain, and it seems like there is no way out.  You are engaging in risky behaviors that could lead to death.  If you have any of these thoughts or symptoms, get help right away:  Go to your nearest emergency department or crisis center.  Call emergency services (911 in the [place]  Call or text a suicide crisis helpline.  Summary  Suicide is when you take your own life. Suicidal feelings are thoughts about ending your own life.  Promise yourself that you will not do anything bad or extreme when you have suicidal feelings.  Let family, friends, teachers, or counselors know how you are feeling.  Get help right away if you start making plans for how to commit suicide.  This information is not intended to replace advice given to you by your health care provider. Make sure you discuss any questions you have with your health care provider. TAKE ALL OF YOUR USUAL MEDICATIONS    CALL YOUR PSYCHIATRIST MONDAY TO DISCUSS YOUR EMERGENCY ROOM VISIT    Suicidal Feelings: How to Help Yourself  Suicide is when you end your own life. Suicidal ideation includes expressing thoughts about, or a preoccupation with, ending your own life. There are many things you can do to help yourself feel better when struggling with these feelings. Many services and people are available to support you and others who struggle with similar feelings.    If you ever feel like you may hurt yourself or others, or have thoughts about taking your own life, get help right away. To get help:  Go to your nearest emergency department.  Call your local emergency services (911 in the [place]  Call the [facility] and human services helpline (059 in the [place]  Call or text a suicide hotline to speak with a trained counselor. The following suicide hotlines are available in the [place]:  8-321-735-TALK (1-262.775.9263 or 365 in the [place]  8-272-HRJNYRT (1-610.254.3302).  Text 923048. This is the Crisis Text Line in the [place]  1-362.628.9607. This is a hotline for Monegas[place] speakers.  1-227.722.2966. This is a hotline for TTY users.  6-628-2-U-JASBIR (1-982.720.1038). This is a hotline for lesbian, maria, bisexual, transgender, or questioning youth.  For a list of hotlines in [place], visit suicide.org/hotlines/international/raljvc-xytdhom-ggbgzkab.html  Contact a crisis center or a local suicide prevention center. To find a crisis center or suicide prevention center:  Call your local hospital, clinic, community service organization, mental health center, social service provider, or health department. Ask for help with connecting to a crisis center.  For a list of crisis centers in the [place], visit: suicidepreventionlifeline.org  For a list of crisis centers in [place], visit: suicideprevention.ca  How to help yourself feel better  A teen talking with an adult.  Promise yourself that you will not do anything bad or extreme when you have suicidal feelings. Remember the times you have felt hopeful.  Many people have gotten through suicidal thoughts and feelings, and you can too.  If you have had these feelings before, remind yourself that you can get through them again.  Let family, friends, teachers, or counselors know how you are feeling. Do not separate yourself from those who care about you and want to help you.  Talk with someone every day, even if you do not feel like talking to anyone or being with other people.  Face-to-face conversation is best to help them understand your feelings.  Contact a mental health care provider and work with this person regularly.  Make a safety plan that you can follow during a crisis.  Include phone numbers of suicide prevention hotlines, mental health professionals, and trusted friends and family members you can call during an emergency.  Save these numbers on your phone.  If you are thinking of taking a lot of medicine, give your medicine to someone who can give it to you as prescribed.  If you are on antidepressants and are concerned you will overdose, tell your health care provider so that he or she can give you safer medicines.  Try to stick to your routines and follow a schedule every day. Make self-care a priority.  Make a list of realistic goals, and cross them off when you achieve them. Accomplishments can give you a sense of worth.  Wait until you are feeling better before doing things that you find difficult or unpleasant.  Do things that you have always enjoyed to take your mind off your feelings.  Try reading a book, or listening to or playing music.  Spending time outside, in nature, may help you feel better.  Follow these instructions at home:  A sign asking the reader not to drink beer, wine, or hard liquor.   Visit your primary health care provider every year for a physical and a mental health checkup.  Take over-the-counter and prescription medicines only as told by your health care provider.  Ask your health care provider about the possible side effects of any medicines you are taking.  Ask your health care provider about whether suicidal ideation is a possible side effect of any of your medicines.  Learn about suicidal ideation and what increases the risk for the development of suicidal thoughts.  Eat a well-balanced diet, and eat regular meals.  Get plenty of rest.  Exercise if you are able. Just 30 minutes of exercise each day can help you feel better.  Keep your living space well lit.  Do not use alcohol or drugs. Remove these substances from your home.  General recommendations  Remove weapons, poisons, knives, and other deadly items from your home.  Work with a mental health care provider as needed.  When you are feeling well, write yourself a letter with tips and support that you can read when you are not feeling well.  Remember that life's difficulties can be sorted out with help. Conditions can be treated, and you can learn behaviors and ways of thinking that will help you.  Work with your health care provider or counselor to learn ways of coping with your thoughts and feelings.  Where to find more information  National Suicide Prevention Lifeline: www.suicidepreventionlifeline.org  Hopeline: www.hopeline.[facility]  American Foundation for Suicide Prevention: www.afsp.org  The Jasbir Project (for lesbian, maria, bisexual, transgender, or questioning youth): www.thetrevorproject.org  National [place] of Mental Health: www.nimh.nih.gov/health/topics/suicide-prevention  Suicide Prevention Resources: afsp.org/suicide-prevention-resources  Contact a health care provider if:  You feel as though you are a burden to others.  You feel agitated, angry, vengeful, or have extreme mood swings.  You have withdrawn from family and friends.  You are frequently using drugs or alcohol.  Get help right away if:  You are talking about suicide or wishing to die.  You start making plans for how to commit suicide.  You feel that you have no reason to live.  You start making plans for putting your affairs in order, saying goodbye, or giving your possessions away.  You feel guilt, shame, or unbearable pain, and it seems like there is no way out.  You are engaging in risky behaviors that could lead to death.  If you have any of these thoughts or symptoms, get help right away:  Go to your nearest emergency department or crisis center.  Call emergency services (911 in the [place]  Call or text a suicide crisis helpline.  Summary  Suicide is when you take your own life. Suicidal feelings are thoughts about ending your own life.  Promise yourself that you will not do anything bad or extreme when you have suicidal feelings.  Let family, friends, teachers, or counselors know how you are feeling.  Get help right away if you start making plans for how to commit suicide.  This information is not intended to replace advice given to you by your health care provider. Make sure you discuss any questions you have with your health care provider. TAKE ALL OF YOUR USUAL MEDICATIONS    CALL YOUR PSYCHIATRIST MONDAY TO DISCUSS YOUR EMERGENCY ROOM VISIT    Suicidal Feelings: How to Help Yourself  Suicide is when you end your own life. Suicidal ideation includes expressing thoughts about, or a preoccupation with, ending your own life. There are many things you can do to help yourself feel better when struggling with these feelings. Many services and people are available to support you and others who struggle with similar feelings.    If you ever feel like you may hurt yourself or others, or have thoughts about taking your own life, get help right away. To get help:  Go to your nearest emergency department.  Call your local emergency services (911 in the [place]  Call the [facility] and human services helpline (178 in the [place]  Call or text a suicide hotline to speak with a trained counselor. The following suicide hotlines are available in the [place]:  9-941-813-TALK (1-178.601.5853 or 977 in the [place]  5-402-KYNSQHM (1-701.852.3405).  Text 735647. This is the Crisis Text Line in the [place]  1-270.627.1634. This is a hotline for Stat[place] speakers.  1-423.745.6081. This is a hotline for TTY users.  0-621-3-U-JASBIR (1-270.173.9174). This is a hotline for lesbian, maria, bisexual, transgender, or questioning youth.  For a list of hotlines in [place], visit suicide.org/hotlines/international/rzncju-vpqhhcc-ytgqicwy.html  Contact a crisis center or a local suicide prevention center. To find a crisis center or suicide prevention center:  Call your local hospital, clinic, community service organization, mental health center, social service provider, or health department. Ask for help with connecting to a crisis center.  For a list of crisis centers in the [place], visit: suicidepreventionlifeline.org  For a list of crisis centers in [place], visit: suicideprevention.ca  How to help yourself feel better  A teen talking with an adult.  Promise yourself that you will not do anything bad or extreme when you have suicidal feelings. Remember the times you have felt hopeful.  Many people have gotten through suicidal thoughts and feelings, and you can too.  If you have had these feelings before, remind yourself that you can get through them again.  Let family, friends, teachers, or counselors know how you are feeling. Do not separate yourself from those who care about you and want to help you.  Talk with someone every day, even if you do not feel like talking to anyone or being with other people.  Face-to-face conversation is best to help them understand your feelings.  Contact a mental health care provider and work with this person regularly.  Make a safety plan that you can follow during a crisis.  Include phone numbers of suicide prevention hotlines, mental health professionals, and trusted friends and family members you can call during an emergency.  Save these numbers on your phone.  If you are thinking of taking a lot of medicine, give your medicine to someone who can give it to you as prescribed.  If you are on antidepressants and are concerned you will overdose, tell your health care provider so that he or she can give you safer medicines.  Try to stick to your routines and follow a schedule every day. Make self-care a priority.  Make a list of realistic goals, and cross them off when you achieve them. Accomplishments can give you a sense of worth.  Wait until you are feeling better before doing things that you find difficult or unpleasant.  Do things that you have always enjoyed to take your mind off your feelings.  Try reading a book, or listening to or playing music.  Spending time outside, in nature, may help you feel better.  Follow these instructions at home:  A sign asking the reader not to drink beer, wine, or hard liquor.   Visit your primary health care provider every year for a physical and a mental health checkup.  Take over-the-counter and prescription medicines only as told by your health care provider.  Ask your health care provider about the possible side effects of any medicines you are taking.  Ask your health care provider about whether suicidal ideation is a possible side effect of any of your medicines.  Learn about suicidal ideation and what increases the risk for the development of suicidal thoughts.  Eat a well-balanced diet, and eat regular meals.  Get plenty of rest.  Exercise if you are able. Just 30 minutes of exercise each day can help you feel better.  Keep your living space well lit.  Do not use alcohol or drugs. Remove these substances from your home.  General recommendations  Remove weapons, poisons, knives, and other deadly items from your home.  Work with a mental health care provider as needed.  When you are feeling well, write yourself a letter with tips and support that you can read when you are not feeling well.  Remember that life's difficulties can be sorted out with help. Conditions can be treated, and you can learn behaviors and ways of thinking that will help you.  Work with your health care provider or counselor to learn ways of coping with your thoughts and feelings.  Where to find more information  National Suicide Prevention Lifeline: www.suicidepreventionlifeline.org  Hopeline: www.hopeline.[employer]  American Foundation for Suicide Prevention: www.afsp.org  The Jasbir Project (for lesbian, maria, bisexual, transgender, or questioning youth): www.thetrevorproject.org  National [place] of Mental Health: www.nimh.nih.gov/health/topics/suicide-prevention  Suicide Prevention Resources: afsp.org/suicide-prevention-resources  Contact a health care provider if:  You feel as though you are a burden to others.  You feel agitated, angry, vengeful, or have extreme mood swings.  You have withdrawn from family and friends.  You are frequently using drugs or alcohol.  Get help right away if:  You are talking about suicide or wishing to die.  You start making plans for how to commit suicide.  You feel that you have no reason to live.  You start making plans for putting your affairs in order, saying goodbye, or giving your possessions away.  You feel guilt, shame, or unbearable pain, and it seems like there is no way out.  You are engaging in risky behaviors that could lead to death.  If you have any of these thoughts or symptoms, get help right away:  Go to your nearest emergency department or crisis center.  Call emergency services (911 in the [place]  Call or text a suicide crisis helpline.  Summary  Suicide is when you take your own life. Suicidal feelings are thoughts about ending your own life.  Promise yourself that you will not do anything bad or extreme when you have suicidal feelings.  Let family, friends, teachers, or counselors know how you are feeling.  Get help right away if you start making plans for how to commit suicide.  This information is not intended to replace advice given to you by your health care provider. Make sure you discuss any questions you have with your health care provider.

## 2023-11-17 NOTE — ED BEHAVIORAL HEALTH ASSESSMENT NOTE - NSSUICPROTFACT_PSY_ALL_CORE
Supportive social network of family or friends/Positive therapeutic relationships Identifies reasons for living/Supportive social network of family or friends/Cultural, spiritual and/or moral attitudes against suicide/Positive therapeutic relationships/Taoism beliefs Identifies reasons for living/Supportive social network of family or friends/Cultural, spiritual and/or moral attitudes against suicide/Positive therapeutic relationships/Judaism beliefs Identifies reasons for living/Supportive social network of family or friends/Cultural, spiritual and/or moral attitudes against suicide/Positive therapeutic relationships/Advent beliefs
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
## 2023-11-17 NOTE — ED ADULT NURSE NOTE - SUICIDE RISK FACTORS
Alcohol/Substance abuse disorders/Agitation/Severe Anxiety/Panic/Insomnia/Psychotic disorder current/past/Recent onset of current/past psychiatric diagnosis/Family History of Suicidal behavior
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
## 2023-11-17 NOTE — ED BEHAVIORAL HEALTH ASSESSMENT NOTE - NSBHATTESTBILLING_PSY_A_CORE
[id number] diagnostic evaluation with medical services [id number] diagnostic evaluation with medical services [id number] diagnostic evaluation with medical services

## 2023-11-17 NOTE — ED BEHAVIORAL HEALTH ASSESSMENT NOTE - OTHER PAST PSYCHIATRIC HISTORY (INCLUDE DETAILS REGARDING ONSET, COURSE OF ILLNESS, INPATIENT/OUTPATIENT TREATMENT)
PSYCKES:   MEDICAID ID: [id number]  [place] FOR SI:   SDOH: Disappearance and death of family member, Unemployment, unspecified,   Homelessness unspecified  QUALITY FLAGS: High Mental Health Need, High Utilization - Inpt/ER, No Continuity of Care after Detox to Lower Level of Care No Engagement in Opioid Use Disorder (OUD) Treatment No Engagement in AGATHA Treatment No Follow Up After High-Intensity Care for AGATHA (30 days) No Follow Up After High-Intensity Care for AGATHA (7 days) No Initiation of Medication Assisted Treatment (MAT) for New Episode of Opioid Use Disorder (OUD) No Utilization of Pharmacotherapy for Alcohol Abuse or Dependence, HARP Enrolled - Not Health Home Enrolled HARP-Enrolled - No Assessment for HCBS  BEHAVIORAL HEALTH DIAGNOSES: Opioid related disorders Unspecified/Other Anxiety Disorder Tobacco related disorder Panic Disorder PTSD Alcohol related disorders Attention Deficit Hyperactivity Disorder Cocaine related disorders Sedative, hypnotic, or anxiolytic related disorders Parasomnia Unspecified/Other Bipolar Major Depressive Disorder Generalized Anxiety Disorder Other psychoactive substance related disorders Schizoaffective Disorder Schizophrenia Unspecified/Other Depressive Disorder Adjustment Disorder Bipolar I Cannabis related disorders Substance-Induced Depressive Disorder  BEHAVIORAL HEALTH MEDICATIONS: Sertraline HCl 50 mg PO daily (05/08/23 – 09/26/23), Clonidine HCl 0.1 mg PO BID (05/28/23 – 09/26/23), Bupropion HCl  mg PO daily (12/21/22), Escitalopram Oxalate 10 mg PO daily (12/19/22), Gabapentin 300 mg Po daily (08/22/22), Alprazolam 1 mg PO TID (02/06/23 – 10/24/23), Methadone Maintenance (09/26/22 – 10/02/23), Amphetamine-Dextroamphetamine 5 mg Po daily (09/01/21 – 12/23/21),   OUTPATIENT: [facility] (08/29/22 – 10/02/23) for Opioid Dependence Uncomplicated, [employer] (06/05/23) for ADHD unspecified type, [facility] (01/10/23) for MAY,  [facility] (09/01/21 – 09/29/21; 10/13/21 – 09/28/22) for ADHD unspecified type  INPATIENT:  x2 most recently at [facility] (06/18/23 – 06/23/23) for PTSD, [facility] (09/19/22 - 09/23/22) for Bipolar Disorder Unspecified, ORNELAS x10 most recently at [facility] (07/09/22 – 07/13/22) for Alcohol Dependence with withdrawal uncomplicated,   ED: MH x1 most recently at [facility] (02/20/23) for Adjustment Disorder Unspecified and ORNELAS x3 most recently at [facility] (02/28/23) for Alcohol Abuse with Intoxication Unspecified PSYCKES:   MEDICAID ID: [id number]  [place] FOR SI:   SDOH: Disappearance and death of family member, Unemployment, unspecified,   Homelessness unspecified  QUALITY FLAGS: High Mental Health Need, High Utilization - Inpt/ER, No Continuity of Care after Detox to Lower Level of Care No Engagement in Opioid Use Disorder (OUD) Treatment No Engagement in AGATHA Treatment No Follow Up After High-Intensity Care for AGATHA (30 days) No Follow Up After High-Intensity Care for AGATHA (7 days) No Initiation of Medication Assisted Treatment (MAT) for New Episode of Opioid Use Disorder (OUD) No Utilization of Pharmacotherapy for Alcohol Abuse or Dependence, HARP Enrolled - Not Health Home Enrolled HARP-Enrolled - No Assessment for HCBS  BEHAVIORAL HEALTH DIAGNOSES: Opioid related disorders Unspecified/Other Anxiety Disorder Tobacco related disorder Panic Disorder PTSD Alcohol related disorders Attention Deficit Hyperactivity Disorder Cocaine related disorders Sedative, hypnotic, or anxiolytic related disorders Parasomnia Unspecified/Other Bipolar Major Depressive Disorder Generalized Anxiety Disorder Other psychoactive substance related disorders Schizoaffective Disorder Schizophrenia Unspecified/Other Depressive Disorder Adjustment Disorder Bipolar I Cannabis related disorders Substance-Induced Depressive Disorder  BEHAVIORAL HEALTH MEDICATIONS: Sertraline HCl 50 mg PO daily (05/08/23 – 09/26/23), Clonidine HCl 0.1 mg PO BID (05/28/23 – 09/26/23), Bupropion HCl  mg PO daily (12/21/22), Escitalopram Oxalate 10 mg PO daily (12/19/22), Gabapentin 300 mg Po daily (08/22/22), Alprazolam 1 mg PO TID (02/06/23 – 10/24/23), Methadone Maintenance (09/26/22 – 10/02/23), Amphetamine-Dextroamphetamine 5 mg Po daily (09/01/21 – 12/23/21),   OUTPATIENT: [facility] (08/29/22 – 10/02/23) for Opioid Dependence Uncomplicated, [employer] (06/05/23) for ADHD unspecified type, [facility] (01/10/23) for MAY,  [facility] (09/01/21 – 09/29/21; 10/13/21 – 09/28/22) for ADHD unspecified type  INPATIENT:  x2 most recently at [facility] (06/18/23 – 06/23/23) for PTSD, [facility] (09/19/22 - 09/23/22) for Bipolar Disorder Unspecified, ORNELAS x10 most recently at [facility] (07/09/22 – 07/13/22) for Alcohol Dependence with withdrawal uncomplicated,   ED: MH x1 most recently at [facility] (02/20/23) for Adjustment Disorder Unspecified and ORNELAS x3 most recently at [facility] (02/28/23) for Alcohol Abuse with Intoxication Unspecified PSYCKES:   MEDICAID ID: [id number]  [place] FOR SI:   SDOH: Disappearance and death of family member, Unemployment, unspecified,   Homelessness unspecified  QUALITY FLAGS: High Mental Health Need, High Utilization - Inpt/ER, No Continuity of Care after Detox to Lower Level of Care No Engagement in Opioid Use Disorder (OUD) Treatment No Engagement in AGATHA Treatment No Follow Up After High-Intensity Care for AGATHA (30 days) No Follow Up After High-Intensity Care for AGATHA (7 days) No Initiation of Medication Assisted Treatment (MAT) for New Episode of Opioid Use Disorder (OUD) No Utilization of Pharmacotherapy for Alcohol Abuse or Dependence, HARP Enrolled - Not Health Home Enrolled HARP-Enrolled - No Assessment for HCBS  BEHAVIORAL HEALTH DIAGNOSES: Opioid related disorders Unspecified/Other Anxiety Disorder Tobacco related disorder Panic Disorder PTSD Alcohol related disorders Attention Deficit Hyperactivity Disorder Cocaine related disorders Sedative, hypnotic, or anxiolytic related disorders Parasomnia Unspecified/Other Bipolar Major Depressive Disorder Generalized Anxiety Disorder Other psychoactive substance related disorders Schizoaffective Disorder Schizophrenia Unspecified/Other Depressive Disorder Adjustment Disorder Bipolar I Cannabis related disorders Substance-Induced Depressive Disorder  BEHAVIORAL HEALTH MEDICATIONS: Sertraline HCl 50 mg PO daily (05/08/23 – 09/26/23), Clonidine HCl 0.1 mg PO BID (05/28/23 – 09/26/23), Bupropion HCl  mg PO daily (12/21/22), Escitalopram Oxalate 10 mg PO daily (12/19/22), Gabapentin 300 mg Po daily (08/22/22), Alprazolam 1 mg PO TID (02/06/23 – 10/24/23), Methadone Maintenance (09/26/22 – 10/02/23), Amphetamine-Dextroamphetamine 5 mg Po daily (09/01/21 – 12/23/21),   OUTPATIENT: [facility] (08/29/22 – 10/02/23) for Opioid Dependence Uncomplicated, [employer] (06/05/23) for ADHD unspecified type, [facility] (01/10/23) for MAY,  Nurse Practitioner Psychiatry (09/01/21 – 09/29/21; 10/13/21 – 09/28/22) for ADHD unspecified type  INPATIENT:  x2 most recently at [facility] (06/18/23 – 06/23/23) for PTSD, [facility] (09/19/22 - 09/23/22) for Bipolar Disorder Unspecified, ORNELAS x10 most recently at [facility] (07/09/22 – 07/13/22) for Alcohol Dependence with withdrawal uncomplicated,   ED: MH x1 most recently at [facility] (02/20/23) for Adjustment Disorder Unspecified and ORNELAS x3 most recently at [facility] (02/28/23) for Alcohol Abuse with Intoxication Unspecified
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
## 2023-11-17 NOTE — ED ADULT NURSE NOTE - NSFALLRISK_ED_ALL_ED
Acknowledged however if patient is getting sicker or develops a fever productive cough and feels that she is in need of an antibiotic then she should be re-evaluated either here or at an urgent care I am willing to see her at any time again and help if necessary but often times an antibiotic may not be needed and be unnecessary and create other side effects that are un wanted and problematic
Notified pt and made appt for 1:00 today
Notify patient there is no dominant lung nodule as seen on the chest x-ray she has multiple very small nodules related to bronchiolitis which is due to her smoking  This creates changes in the lungs nothing is there that shows cancer at this time but she needs to work on stopping smoking to prevent emphysema as she gets older  I will go over this with her again in the future at her next office visit    Due to her mental health issues I highly doubt that she will stop smoking and if she is not prepared to quit she will continue at this time until she is on proper medication and feels mentally prepared
Pt asking for results of CT scan, I called to have results read for pt  They stated that they were going to have the dr read it right away and put in Epic 
Pt is upset says she needs an antibiotic and that the dr should have given one to her    She will call back
Results in epic
No

## 2023-11-17 NOTE — ED PROVIDER NOTE - PATIENT PORTAL LINK FT
Pt was notified of clinical message. Pt stated she is not currently experiencing any Sx.    You can access the FollowMyHealth Patient Portal offered by [facility] by registering at the following website: [email or web address]. By joining [employer]’s FollowMyHealth portal, you will also be able to view your health information using other applications (apps) compatible with our system. You can access the FollowMyHealth Patient Portal offered by [facility] by registering at the following website: [email or web address]. By joining [employer]’s FollowMyHealth portal, you will also be able to view your health information using other applications (apps) compatible with our system. You can access the FollowMyHealth Patient Portal offered by [facility] by registering at the following website: [email or web address]. By joining [employer]’s FollowMyHealth portal, you will also be able to view your health information using other applications (apps) compatible with our system.

## 2023-11-17 NOTE — ED BEHAVIORAL HEALTH ASSESSMENT NOTE - DIFFERENTIAL
Schizoaffective Disorder   Major Depression with psychosis PTSD  Polysubstance Use Disorder  r/o SIMD/SIP  r/o Schizoaffective Disorder, unspecified type

## 2023-11-18 DIAGNOSIS — F43.10 POST-TRAUMATIC STRESS DISORDER, UNSPECIFIED: ICD-10-CM

## 2023-11-18 DIAGNOSIS — F19.90 OTHER PSYCHOACTIVE SUBSTANCE USE, UNSPECIFIED, UNCOMPLICATED: ICD-10-CM

## 2023-11-21 DIAGNOSIS — Z63.4 DISAPPEARANCE AND DEATH OF FAMILY MEMBER: ICD-10-CM

## 2023-11-21 DIAGNOSIS — F10.90 ALCOHOL USE, UNSPECIFIED, UNCOMPLICATED: ICD-10-CM

## 2023-11-21 DIAGNOSIS — Z20.822 CONTACT WITH AND (SUSPECTED) EXPOSURE TO COVID-19: ICD-10-CM

## 2023-11-21 DIAGNOSIS — F31.9 BIPOLAR DISORDER, UNSPECIFIED: ICD-10-CM

## 2023-11-21 DIAGNOSIS — Z91.52 PERSONAL HISTORY OF NONSUICIDAL SELF-HARM: ICD-10-CM

## 2023-11-21 DIAGNOSIS — F43.10 POST-TRAUMATIC STRESS DISORDER, UNSPECIFIED: ICD-10-CM

## 2023-11-21 DIAGNOSIS — R44.0 AUDITORY HALLUCINATIONS: ICD-10-CM

## 2023-11-21 DIAGNOSIS — F19.90 OTHER PSYCHOACTIVE SUBSTANCE USE, UNSPECIFIED, UNCOMPLICATED: ICD-10-CM

## 2023-11-21 DIAGNOSIS — F41.9 ANXIETY DISORDER, UNSPECIFIED: ICD-10-CM

## 2023-11-21 DIAGNOSIS — F90.9 ATTENTION-DEFICIT HYPERACTIVITY DISORDER, UNSPECIFIED TYPE: ICD-10-CM

## 2023-11-21 SDOH — SOCIAL STABILITY - SOCIAL INSECURITY: DISSAPEARANCE AND DEATH OF FAMILY MEMBER: Z63.4

## 2024-08-23 NOTE — BH INPATIENT PSYCHIATRY DISCHARGE NOTE - LEVEL OF CONSCIOUSNESS
Please advise-     Could we order a standing order for Urinalysis for patient due to continued urinary symptoms?  Recommend patient see Urology due to continued symptoms?   Alert

## 2024-11-16 NOTE — ED PROVIDER NOTE - HIV OFFER
Cardiac Surgery Progress Note    Subjective  Examined. Pt is seen resting in bed and sating well on RA.    History Of Present Illness  Patient is a 53 year old female with a past history of HLD, DM, and chronic neck, shoulder, and back pain who presented ton the ED (11/13/24) with worsening neck and back pain. Pt reported starting upper thoracic steroid injections 2 weeks ago and recieved 2nd dose 2 days prior to admission where upon returning home that day, she felt severe pain described as \"shock like sensation up and down her back, neck, and radiating to right arm. Pt had CT chest done that revealed ascending aortic aneurysm measuring about 4.3. CV Sx consulted for further evaluation.     Discussed nature of aortic aneurysm. She has a non-contrast study at this time. We will obtain CTA Chest to evaluate size of ascending aortic aneurysm. Plan will likely be medical management and pt can be followed in the aortic registry at [facility] with a plan for f/u CTA in 6 months to assess ascending aorta if CTA does not show any concerning findings.     Past Medical History   has a past medical history of Acquired absence of both cervix and uterus, Diabetes 1.5, managed as type 2  (CMD), Hematuria, Hypercholesteremia, Irregular menstrual cycle, Migraine, and PMS (premenstrual syndrome).    She has no past medical history of Difficult intubation, Failed moderate sedation during procedure, Malignant hyperthermia, PONV (postoperative nausea and vomiting), or Spinal headache.  Surgical History   has a past surgical history that includes Ankle surgery; Cystoscopy; Knee surgery; and Robotic assisted hysterectomy - Cervix Removed (12/23/2020).     Social History   reports that she has quit smoking. She has never used smokeless tobacco. She reports current alcohol use. She reports that she does not use drugs.  Family History  family history includes Congenital Heart Disease in her mother; Diabetes in her brother; Glaucoma in her  father; Osteoporosis in her maternal grandmother.    Review of Systems  Constitutional: Denies fever, chills, sweats, or fatigue  ENT/Mouth: Negative for sore throat, Rhinorrhea, or hearing changes  Eyes: Negative for eye pain, redness, or any vision changes  Cardiovascular: Denies chest pain, edema, SOB, or palpitations  Respiratory: Denies cough, any sputum, or wheezing  GI: Denies n/v/d, constipation or melena  Genitourinary: Negative for dysuria or hematuria  Musculoskeletal: Denies pain, joint swelling, stiffness, or back pain  Skin: Negative for skin lesions or pruritis  Neuro: Negative for weakness, numbness, or tingling  Psych: Denies feeling depressed or distressed       Physical Exam  Physical Exam  Constitutional:       General: She is not in acute distress.  HENT:      Head: Normocephalic.   Eyes:      Conjunctiva/sclera: Conjunctivae normal.   Cardiovascular:      Rate and Rhythm: Regular rhythm. Bradycardia present.   Pulmonary:      Effort: Pulmonary effort is normal.   Abdominal:      Palpations: Abdomen is soft.   Skin:     General: Skin is warm and dry.   Neurological:      Mental Status: She is oriented to person, place, and time.   Psychiatric:         Mood and Affect: Mood normal.         I have independently reviewed all vitals, labs, and imaging documented below.    Last Recorded Vitals  Temp:  [97.2 °F (36.2 °C)-98.4 °F (36.9 °C)] 98.1 °F (36.7 °C)  Heart Rate:  [54-67] 54  Resp:  [16-18] 16  BP: (101-122)/(58-73) 101/58          Labs  Recent Labs   Lab 11/15/24  0542 11/13/24  1409   SODIUM 140 136   POTASSIUM 3.7 3.8   CHLORIDE 107 106   CO2 27 23   BUN 27* 21*   CREATININE 0.57 0.39*   GLUCOSE 135* 157*   CALCIUM 8.7 9.8     Recent Labs   Lab 11/15/24  0542 11/13/24  1409   SODIUM 140 136   CHLORIDE 107 106   CO2 27 23   BUN 27* 21*   CREATININE 0.57 0.39*   CALCIUM 8.7 9.8   ALBUMIN 3.2* 3.9   BILIRUBIN 0.4 0.4   ALKPT 50 59   GPT 37 36   AST 18 17   GLUCOSE 135* 157*     Recent Labs    Lab 11/13/24  1409   WBC 12.4*   RBC 4.92   HGB 13.4   HCT 40.4               Assessment    Patient is a 53 year old female with a past medical history of HLD, DM, and chronic neck, shoulder, and back pain who presented ton the ED (11/13/24) with worsening neck and back pain. Pt reported starting upper thoracic steroid injections 2 weeks ago and recieved 2nd dose 2 days prior to admission where upon returning home that day, she felt severe pain described as \"shock like sensation up and down her back, neck, and radiating to right arm. Pt had CT chest done that revealed ascending aortic aneurysm measuring about 4.3. CV Sx consulted for further evaluation.     Discussed nature of aortic aneurysm. She has a non-contrast study at this time. We will obtain CTA Chest to evaluate size of ascending aortic aneurysm. Plan will likely be medical management and pt can be followed in the aortic registry at [facility] with a plan for f/u CTA in 6 months to assess ascending aorta if CTA does not show any concerning findings.    Plan    - F/u CTA results.    Charting performed by maxime Reynoso for Dr. Chavarria.    All medical record entries made by the maxime were at my direction. I have reviewed the chart and agree that the record accurately reflects my personal performance of the history, physical exam, hospital course, and assessment and plan.        Previously Declined (within the last year)

## 2025-03-26 NOTE — BH INPATIENT PSYCHIATRY ASSESSMENT NOTE - NSTXPOTSDPROGRES_PSY_ALL_CORE
Quality 226: Preventive Care And Screening: Tobacco Use: Screening And Cessation Intervention: Patient screened for tobacco use and is an ex/non-smoker Quality 130: Documentation Of Current Medications In The Medical Record: Current Medications Documented Quality 431: Preventive Care And Screening: Unhealthy Alcohol Use - Screening: Patient not identified as an unhealthy alcohol user when screened for unhealthy alcohol use using a systematic screening method Detail Level: Detailed Quality 47: Advance Care Plan: Advance Care Planning discussed and documented; advance care plan or surrogate decision maker documented in the medical record. Improving